# Patient Record
Sex: FEMALE | Race: WHITE | NOT HISPANIC OR LATINO | Employment: STUDENT | URBAN - METROPOLITAN AREA
[De-identification: names, ages, dates, MRNs, and addresses within clinical notes are randomized per-mention and may not be internally consistent; named-entity substitution may affect disease eponyms.]

---

## 2020-07-21 ENCOUNTER — OFFICE VISIT (OUTPATIENT)
Dept: FAMILY MEDICINE | Facility: CLINIC | Age: 20
End: 2020-07-21
Payer: COMMERCIAL

## 2020-07-21 DIAGNOSIS — U07.1 COVID-19 VIRUS DETECTED: ICD-10-CM

## 2020-07-21 DIAGNOSIS — R53.83 FATIGUE, UNSPECIFIED TYPE: Primary | ICD-10-CM

## 2020-07-21 DIAGNOSIS — F32.A DEPRESSION, UNSPECIFIED DEPRESSION TYPE: ICD-10-CM

## 2020-07-21 PROCEDURE — 99213 PR OFFICE/OUTPT VISIT, EST, LEVL III, 20-29 MIN: ICD-10-PCS | Mod: 95,ICN,, | Performed by: FAMILY MEDICINE

## 2020-07-21 PROCEDURE — 99213 OFFICE O/P EST LOW 20 MIN: CPT | Mod: 95,ICN,, | Performed by: FAMILY MEDICINE

## 2020-07-21 RX ORDER — ESCITALOPRAM OXALATE 10 MG/1
10 TABLET ORAL DAILY
Qty: 30 TABLET | Refills: 0 | Status: SHIPPED | OUTPATIENT
Start: 2020-07-21 | End: 2020-08-11 | Stop reason: SDUPTHER

## 2020-07-21 NOTE — PROGRESS NOTES
The patient location is: Home  The chief complaint leading to consultation is:Fatigue and DepressionVisit type: Virtual visit with synchronous audio and video  Total time spent with patient: 10 minutes  Each patient to whom he or she provides medical services by telemedicine is:  (1) informed of the relationship between the physician and patient and the respective role of any other health care provider with respect to management of the patient; and (2) notified that he or she may decline to receive medical services by telemedicine and may withdraw from such care at any time.    Notes:   Lissteh Rivas presents with moderate fatigue throughout the day, trouble initiating sleep w poor sleep quality and depressed feeling for several month maybe a year ago. FH Depression in Mother treated 2 times w lexapro w good results and no side effects. Patient has no precipitating event or obvious environmental contribution. Has been worse since covid lockdown.    No past medical history on file.  No past surgical history on file.  Review of patient's allergies indicates:   Allergen Reactions    Oranges [orange] Rash     No current outpatient medications on file prior to visit.     No current facility-administered medications on file prior to visit.      Social History     Socioeconomic History    Marital status: Single     Spouse name: Not on file    Number of children: Not on file    Years of education: Not on file    Highest education level: Not on file   Occupational History    Not on file   Social Needs    Financial resource strain: Not on file    Food insecurity     Worry: Not on file     Inability: Not on file    Transportation needs     Medical: Not on file     Non-medical: Not on file   Tobacco Use    Smoking status: Passive Smoke Exposure - Never Smoker   Substance and Sexual Activity    Alcohol use: Not on file    Drug use: Not on file    Sexual activity: Not on file   Lifestyle    Physical activity      Days per week: Not on file     Minutes per session: Not on file    Stress: Not on file   Relationships    Social connections     Talks on phone: Not on file     Gets together: Not on file     Attends Restoration service: Not on file     Active member of club or organization: Not on file     Attends meetings of clubs or organizations: Not on file     Relationship status: Not on file   Other Topics Concern    Not on file   Social History Narrative    Lives with mother, who smokes outside and occasionally in the house, 1 brother and 1 dog.     Family History   Problem Relation Age of Onset    Asthma Mother     Asthma Brother     Diabetes Maternal Grandmother     Diabetes Paternal Grandfather     Asthma Paternal Grandfather          ROS:  SKIN: No rashes, itching or changes in color or texture of skin.  EYES: Visual acuity fine. No photophobia, ocular pain or diplopia.EARS: Denies ear pain, discharge or vertigo.NOSE: No loss of smell, no epistaxis some postnasal drip.MOUTH & THROAT: No hoarseness or change in voice. No excessive gum bleeding.CHEST: Denies SALGADO, cyanosis, wheezing  CARDIOVASCULAR: Denies chest pain, PND, orthopnea or reduced exercise tolerance.  ABDOMEN:  No weight loss.No abdominal pain, no hematemesis or blood in stool.  URINARY: No flank pain, dysuria or hematuria.  PERIPHERAL VASCULAR: No claudication or cyanosis.  MUSCULOSKELETAL: Negative   NEUROLOGIC: No history of seizures, paralysis, alteration of gait or coordination.    PE: MSE MSE: Patient is alert and oriented x4, no plosive speech, agitation,tangential thoughts. No SI. Admits to depression and is engaged in trying to get better. Has access to counselor and will begin soon.  Impression: Fatigue  Depression   Plan: Rev med risks including incr risk suicide in adolescents, she is to report immediately any suicidal ideation or other side effects  FU PCP 2 w

## 2020-08-11 DIAGNOSIS — F32.A DEPRESSION, UNSPECIFIED DEPRESSION TYPE: Primary | ICD-10-CM

## 2020-08-11 RX ORDER — ESCITALOPRAM OXALATE 10 MG/1
10 TABLET ORAL DAILY
Qty: 30 TABLET | Refills: 0 | Status: SHIPPED | OUTPATIENT
Start: 2020-08-11 | End: 2020-09-08 | Stop reason: SDUPTHER

## 2020-08-11 NOTE — TELEPHONE ENCOUNTER
Patient called in regards to appointment scheduled for 8/25/20. Patient states that she has 8 more pills left and it may not last until her  next appointment date. Patient is requesting a Rx refill refill until she sees Dr. Apple on 8/25/20

## 2020-08-11 NOTE — TELEPHONE ENCOUNTER
----- Message from Jeana Ricky sent at 8/11/2020  9:11 AM CDT -----  Type:  RX Refill Request    Who Called: Lisseth Rivas  Refill or New Rx: Refill  RX Name and Strength: escitalopram oxalate (LEXAPRO) 10 MG tablet  How is the patient currently taking it? (ex. 1XDay): Take 1 tablet (10 mg total) by mouth once daily. - Oral  Is this a 30 day or 90 day RX: 30 days   Preferred Pharmacy with phone number:  [id number] - [facility] [address] AT [facility] [address]  [address] [facility] [address]  Phone: [phone] Fax: [phone]    Local or Mail Order:local  Ordering Provider: Zechariah  Would the patient rather a call back or a response via [facility]? Call back   Best Call Back Number:[phone] (cell)   Additional Information: Patient called in regards to appointment scheduled for 8/25/20. Patient states that she has 8 more pills left and it may not last until her  next appointment date. Patient is requesting a Rx refill refill until she sees Dr. Apple on 8/25/20

## 2020-08-25 ENCOUNTER — OFFICE VISIT (OUTPATIENT)
Dept: FAMILY MEDICINE | Facility: CLINIC | Age: 20
End: 2020-08-25
Payer: COMMERCIAL

## 2020-08-25 ENCOUNTER — LAB VISIT (OUTPATIENT)
Dept: LAB | Facility: HOSPITAL | Age: 20
End: 2020-08-25
Attending: INTERNAL MEDICINE
Payer: COMMERCIAL

## 2020-08-25 VITALS
SYSTOLIC BLOOD PRESSURE: 100 MMHG | DIASTOLIC BLOOD PRESSURE: 60 MMHG | TEMPERATURE: 99 F | BODY MASS INDEX: 21.44 KG/M2 | WEIGHT: 121 LBS | HEIGHT: 63 IN | HEART RATE: 73 BPM

## 2020-08-25 DIAGNOSIS — R53.82 CHRONIC FATIGUE: ICD-10-CM

## 2020-08-25 DIAGNOSIS — F32.4 MAJOR DEPRESSIVE DISORDER WITH SINGLE EPISODE, IN PARTIAL REMISSION: ICD-10-CM

## 2020-08-25 DIAGNOSIS — Z11.59 ENCOUNTER FOR HEPATITIS C SCREENING TEST FOR LOW RISK PATIENT: ICD-10-CM

## 2020-08-25 DIAGNOSIS — R53.82 CHRONIC FATIGUE: Primary | ICD-10-CM

## 2020-08-25 DIAGNOSIS — Z00.00 ENCOUNTER FOR ANNUAL HEALTH EXAMINATION: ICD-10-CM

## 2020-08-25 DIAGNOSIS — N93.9 ABNORMAL UTERINE BLEEDING (AUB): ICD-10-CM

## 2020-08-25 LAB
25(OH)D3+25(OH)D2 SERPL-MCNC: 24 NG/ML (ref 30–96)
ALBUMIN SERPL BCP-MCNC: 4.4 G/DL (ref 3.5–5.2)
ALP SERPL-CCNC: 68 U/L (ref 55–135)
ALT SERPL W/O P-5'-P-CCNC: 13 U/L (ref 10–44)
ANION GAP SERPL CALC-SCNC: 10 MMOL/L (ref 8–16)
AST SERPL-CCNC: 19 U/L (ref 10–40)
BILIRUB SERPL-MCNC: 0.8 MG/DL (ref 0.1–1)
BUN SERPL-MCNC: 14 MG/DL (ref 6–20)
CALCIUM SERPL-MCNC: 10.1 MG/DL (ref 8.7–10.5)
CHLORIDE SERPL-SCNC: 106 MMOL/L (ref 95–110)
CO2 SERPL-SCNC: 24 MMOL/L (ref 23–29)
CREAT SERPL-MCNC: 0.8 MG/DL (ref 0.5–1.4)
ERYTHROCYTE [DISTWIDTH] IN BLOOD BY AUTOMATED COUNT: 12.4 % (ref 11.5–14.5)
EST. GFR  (AFRICAN AMERICAN): >60 ML/MIN/1.73 M^2
EST. GFR  (NON AFRICAN AMERICAN): >60 ML/MIN/1.73 M^2
FERRITIN SERPL-MCNC: 53 NG/ML (ref 20–300)
GLUCOSE SERPL-MCNC: 87 MG/DL (ref 70–110)
HCT VFR BLD AUTO: 39.6 % (ref 37–48.5)
HGB BLD-MCNC: 12.5 G/DL (ref 12–16)
IRON SERPL-MCNC: 72 UG/DL (ref 30–160)
MCH RBC QN AUTO: 29.2 PG (ref 27–31)
MCHC RBC AUTO-ENTMCNC: 31.6 G/DL (ref 32–36)
MCV RBC AUTO: 93 FL (ref 82–98)
PLATELET # BLD AUTO: 247 K/UL (ref 150–350)
PMV BLD AUTO: 12.9 FL (ref 9.2–12.9)
POTASSIUM SERPL-SCNC: 3.4 MMOL/L (ref 3.5–5.1)
PROT SERPL-MCNC: 7.8 G/DL (ref 6–8.4)
RBC # BLD AUTO: 4.28 M/UL (ref 4–5.4)
SATURATED IRON: 19 % (ref 20–50)
SODIUM SERPL-SCNC: 140 MMOL/L (ref 136–145)
TOTAL IRON BINDING CAPACITY: 383 UG/DL (ref 250–450)
TRANSFERRIN SERPL-MCNC: 259 MG/DL (ref 200–375)
WBC # BLD AUTO: 10.61 K/UL (ref 3.9–12.7)

## 2020-08-25 PROCEDURE — 86803 HEPATITIS C AB TEST: CPT

## 2020-08-25 PROCEDURE — 99999 PR PBB SHADOW E&M-EST. PATIENT-LVL III: CPT | Mod: PBBFAC,,, | Performed by: INTERNAL MEDICINE

## 2020-08-25 PROCEDURE — 36415 COLL VENOUS BLD VENIPUNCTURE: CPT | Mod: PO

## 2020-08-25 PROCEDURE — 85027 COMPLETE CBC AUTOMATED: CPT

## 2020-08-25 PROCEDURE — 99204 PR OFFICE/OUTPT VISIT, NEW, LEVL IV, 45-59 MIN: ICD-10-PCS | Mod: S$PBB,ICN,, | Performed by: INTERNAL MEDICINE

## 2020-08-25 PROCEDURE — 82728 ASSAY OF FERRITIN: CPT

## 2020-08-25 PROCEDURE — 80053 COMPREHEN METABOLIC PANEL: CPT

## 2020-08-25 PROCEDURE — 82306 VITAMIN D 25 HYDROXY: CPT

## 2020-08-25 PROCEDURE — 99999 PR PBB SHADOW E&M-EST. PATIENT-LVL III: ICD-10-PCS | Mod: PBBFAC,,, | Performed by: INTERNAL MEDICINE

## 2020-08-25 PROCEDURE — 83540 ASSAY OF IRON: CPT

## 2020-08-25 PROCEDURE — 99204 OFFICE O/P NEW MOD 45 MIN: CPT | Mod: S$PBB,ICN,, | Performed by: INTERNAL MEDICINE

## 2020-08-25 RX ORDER — FLUTICASONE PROPIONATE 50 MCG
1 SPRAY, SUSPENSION (ML) NASAL
COMMUNITY
Start: 2019-10-28 | End: 2022-05-26

## 2020-08-25 RX ORDER — ESTRADIOL 2 MG/1
1 TABLET ORAL DAILY
COMMUNITY
Start: 2020-08-18 | End: 2022-05-26

## 2020-08-25 NOTE — PROGRESS NOTES
Assessment/Plan:    Encounter for annual health examination  Complete history and physical was completed today.  Complete and thorough medication reconciliation was performed.  Discussed risks and benefits of medications.  Advised patient on orders and health maintenance.  We discussed old records and old labs if available.  Will request any records not available through epic.  Continue current medications listed on your summary sheet.    Abnormal uterine bleeding (AUB)  -recent vaginal spotting x several weeks  -on depo injections for the past year  -started PO estradiol with improvement of symptoms  -has GYN follow up next week    Chronic fatigue  -increased fatigue for the past several years  -no improvement since starting SSRI  -check CBC, CMP, TSH, iron studies, vit d    Major depressive disorder with single episode, in partial remission  -hx of depression and anxiety  -started lexapro 10 mg last month  -improvement of symptoms since then with some residual symptoms still present  -denies adverse effects of medication  -plan to continue at current dose for now. If symptoms still present in 2-4 weeks, will increase to 20 mg QD    ______________________________________________________________________________________________________________________________    Orders this visit:    Chronic fatigue  -     Iron and TIBC; Future; Expected date: 08/25/2020  -     Ferritin; Future; Expected date: 08/25/2020  -     Vitamin D; Future; Expected date: 08/25/2020  -     CBC Without Differential; Standing    Major depressive disorder with single episode, in partial remission    Encounter for annual health examination  -     Comprehensive metabolic panel; Standing  -     CBC Without Differential; Standing    Encounter for hepatitis C screening test for low risk patient  -     Hepatitis C Antibody; Future; Expected date: 08/25/2020    Abnormal uterine bleeding (AUB)      Follow up in about 1 year (around 8/25/2021), or if  symptoms worsen or fail to improve.    Jeanette Apple MD  ______________________________________________________________________________________________________________________________    HPI:    Patient is a new patient to me establish care.  Patient is a 19-year-old  female with a past medical history of depression and anxiety.    Previous PCP:  Pediatrician's office    Patient evaluated by 1 of my partners last month for symptoms of depression.  Patient with a family history of depression and anxiety.  Started on Lexapro 10 mg daily.  Reports improvement of depression symptoms since starting medication.  Still continues to have some depressed mood, however much improved.  She also has a history of chronic fatigue, present for the past several years.  Reports that she finds herself sleeping all the time.  Although she has had an improvement in her mood since starting Lexapro, she has not had improvement in her energy level.  Denies any associated chest pains, palpitations, shortness of breath, headaches, abdominal pain, nausea/vomiting, bowel changes, hair/skin changes, cold or heat intolerance.    Of note, patient is also scheduled to follow-up with her gyn soon.  She has been experiencing vaginal spotting.  She has been receiving Depo injections for the past year.  Over the past several months, she started developing vaginal bleeding.  Denies any abdominal pains.  Denies any vaginal discharge.  She was started on estradiol started the symptoms with improvement of bleeding.  She has a follow-up with gyn next month.    Patient no other complaints today.  Routine health maintenance reviewed.  Annual labs ordered.    Past Medical History:  Past Medical History:   Diagnosis Date    Allergic rhinitis     Depression      History reviewed. No pertinent surgical history.  Review of patient's allergies indicates:   Allergen Reactions    Corticosteroids (glucocorticoids) Swelling    Cigarette smoke Hives      "Coughing     Social History     Tobacco Use    Smoking status: Never Smoker    Smokeless tobacco: Never Used   Substance Use Topics    Alcohol use: Not Currently    Drug use: Never     Family History   Problem Relation Age of Onset    Asthma Mother     Anxiety disorder Father     Asthma Brother     Diabetes Maternal Grandmother     Diabetes Paternal Grandfather     Asthma Paternal Grandfather      Current Outpatient Medications on File Prior to Visit   Medication Sig Dispense Refill    escitalopram oxalate (LEXAPRO) 10 MG tablet Take 1 tablet (10 mg total) by mouth once daily. 30 tablet 0    estradioL (ESTRACE) 2 MG tablet Take 1 tablet by mouth once daily.      fluticasone propionate (FLONASE) 50 mcg/actuation nasal spray 1 spray by Nasal route as needed.      medroxyprogesterone acetate (DEPO-PROVERA IM) Inject into the muscle.       No current facility-administered medications on file prior to visit.        Review of Systems   Constitutional: Positive for fatigue. Negative for chills, diaphoresis, fever and unexpected weight change.   HENT: Negative for congestion, ear pain, postnasal drip, sinus pain and sore throat.    Eyes: Negative for pain and redness.   Respiratory: Negative for cough, chest tightness and shortness of breath.    Cardiovascular: Negative for chest pain and leg swelling.   Gastrointestinal: Negative for abdominal pain, constipation, diarrhea, nausea and vomiting.   Genitourinary: Negative for dysuria and hematuria.   Musculoskeletal: Negative for arthralgias and joint swelling.   Skin: Negative for rash.   Neurological: Negative for dizziness, syncope and headaches.       Vitals:    08/25/20 1407   BP: 100/60   Pulse: 73   Temp: 99.1 °F (37.3 °C)   Weight: 54.9 kg (121 lb)   Height: 5' 3" (1.6 m)       Wt Readings from Last 3 Encounters:   08/25/20 54.9 kg (121 lb) (36 %, Z= -0.36)*   05/07/12 37.4 kg (82 lb 8 oz) (38 %, Z= -0.30)*   05/08/12 40.1 kg (88 lb 6.1 oz) (52 %, Z= " 0.04)*     * Growth percentiles are based on CDC (Girls, 2-20 Years) data.       Physical Exam  Constitutional:       General: She is not in acute distress.     Appearance: Normal appearance. She is well-developed.   HENT:      Head: Normocephalic and atraumatic.   Eyes:      Conjunctiva/sclera: Conjunctivae normal.   Neck:      Musculoskeletal: Normal range of motion and neck supple.   Cardiovascular:      Rate and Rhythm: Normal rate and regular rhythm.      Pulses: Normal pulses.      Heart sounds: Normal heart sounds. No murmur.   Pulmonary:      Effort: Pulmonary effort is normal. No respiratory distress.      Breath sounds: Normal breath sounds.   Abdominal:      General: Bowel sounds are normal. There is no distension.      Palpations: Abdomen is soft.      Tenderness: There is no abdominal tenderness.   Musculoskeletal: Normal range of motion.   Skin:     General: Skin is warm and dry.      Findings: No rash.   Neurological:      General: No focal deficit present.      Mental Status: She is alert and oriented to person, place, and time.

## 2020-08-25 NOTE — ASSESSMENT & PLAN NOTE
-increased fatigue for the past several years  -no improvement since starting SSRI  -check CBC, CMP, TSH, iron studies, vit d

## 2020-08-25 NOTE — ASSESSMENT & PLAN NOTE
-recent vaginal spotting x several weeks  -on depo injections for the past year  -started PO estradiol with improvement of symptoms  -has GYN follow up next week

## 2020-08-26 LAB — HCV AB SERPL QL IA: NEGATIVE

## 2020-08-31 DIAGNOSIS — R53.82 CHRONIC FATIGUE: Primary | ICD-10-CM

## 2020-09-09 ENCOUNTER — PATIENT MESSAGE (OUTPATIENT)
Dept: FAMILY MEDICINE | Facility: CLINIC | Age: 20
End: 2020-09-09

## 2020-11-05 ENCOUNTER — TELEPHONE (OUTPATIENT)
Dept: FAMILY MEDICINE | Facility: CLINIC | Age: 20
End: 2020-11-05

## 2020-11-05 NOTE — TELEPHONE ENCOUNTER
MD ABIGAIL Franco Staff             Please make sure patient is aware that she should follow up with me, especially if she is still having the symptoms that caused her to go to the ER.

## 2020-11-30 PROBLEM — Z00.00 ENCOUNTER FOR ANNUAL HEALTH EXAMINATION: Status: RESOLVED | Noted: 2020-08-25 | Resolved: 2020-11-30

## 2021-05-06 ENCOUNTER — PATIENT MESSAGE (OUTPATIENT)
Dept: RESEARCH | Facility: HOSPITAL | Age: 21
End: 2021-05-06

## 2022-01-06 ENCOUNTER — TELEPHONE (OUTPATIENT)
Dept: FAMILY MEDICINE | Facility: CLINIC | Age: 22
End: 2022-01-06

## 2022-01-06 ENCOUNTER — CLINICAL SUPPORT (OUTPATIENT)
Dept: FAMILY MEDICINE | Facility: CLINIC | Age: 22
End: 2022-01-06
Payer: COMMERCIAL

## 2022-01-06 ENCOUNTER — OFFICE VISIT (OUTPATIENT)
Dept: FAMILY MEDICINE | Facility: CLINIC | Age: 22
End: 2022-01-06
Payer: COMMERCIAL

## 2022-01-06 DIAGNOSIS — J02.9 PHARYNGITIS, UNSPECIFIED ETIOLOGY: ICD-10-CM

## 2022-01-06 DIAGNOSIS — R05.9 COUGH: ICD-10-CM

## 2022-01-06 DIAGNOSIS — J06.9 UPPER RESPIRATORY TRACT INFECTION, UNSPECIFIED TYPE: Primary | ICD-10-CM

## 2022-01-06 LAB
CTP QC/QA: YES
CTP QC/QA: YES
FLUAV AG NPH QL: NEGATIVE
FLUBV AG NPH QL: NEGATIVE
SARS-COV-2 RDRP RESP QL NAA+PROBE: POSITIVE

## 2022-01-06 PROCEDURE — 1160F RVW MEDS BY RX/DR IN RCRD: CPT | Mod: CPTII,95,, | Performed by: NURSE PRACTITIONER

## 2022-01-06 PROCEDURE — 1159F MED LIST DOCD IN RCRD: CPT | Mod: CPTII,95,, | Performed by: NURSE PRACTITIONER

## 2022-01-06 PROCEDURE — 1159F PR MEDICATION LIST DOCUMENTED IN MEDICAL RECORD: ICD-10-PCS | Mod: CPTII,95,, | Performed by: NURSE PRACTITIONER

## 2022-01-06 PROCEDURE — 1160F PR REVIEW ALL MEDS BY PRESCRIBER/CLIN PHARMACIST DOCUMENTED: ICD-10-PCS | Mod: CPTII,95,, | Performed by: NURSE PRACTITIONER

## 2022-01-06 PROCEDURE — 99213 OFFICE O/P EST LOW 20 MIN: CPT | Mod: 95,,, | Performed by: NURSE PRACTITIONER

## 2022-01-06 PROCEDURE — 99213 PR OFFICE/OUTPT VISIT, EST, LEVL III, 20-29 MIN: ICD-10-PCS | Mod: 95,,, | Performed by: NURSE PRACTITIONER

## 2022-01-06 RX ORDER — PROMETHAZINE HYDROCHLORIDE AND DEXTROMETHORPHAN HYDROBROMIDE 6.25; 15 MG/5ML; MG/5ML
5 SYRUP ORAL 2 TIMES DAILY PRN
Qty: 118 ML | Refills: 0 | Status: SHIPPED | OUTPATIENT
Start: 2022-01-06 | End: 2022-01-16

## 2022-01-06 RX ORDER — AZITHROMYCIN 250 MG/1
TABLET, FILM COATED ORAL
Qty: 6 TABLET | Refills: 0 | Status: SHIPPED | OUTPATIENT
Start: 2022-01-06 | End: 2022-01-11

## 2022-01-06 NOTE — PROGRESS NOTES
Subjective:       Patient ID: Lisseth Rivas is a 21 y.o. female.    Chief Complaint: No chief complaint on file.  The patient location is: [place]  The chief complaint leading to consultation is: cough, sore throat    Visit type: audiovisual    Face to Face time with patient: 15 min  minutes of total time spent on the encounter, which includes face to face time and non-face to face time preparing to see the patient (eg, review of tests), Obtaining and/or reviewing separately obtained history, Documenting clinical information in the electronic or other health record, Independently interpreting results (not separately reported) and communicating results to the patient/family/caregiver, or Care coordination (not separately reported).         Each patient to whom he or she provides medical services by telemedicine is:  (1) informed of the relationship between the physician and patient and the respective role of any other health care provider with respect to management of the patient; and (2) notified that he or she may decline to receive medical services by telemedicine and may withdraw from such care at any time.    Notes:     Sore Throat   This is a new problem. The current episode started yesterday. The problem has been gradually worsening. Neither side of throat is experiencing more pain than the other. There has been no fever. The fever has been present for less than 1 day. The pain is at a severity of 6/10. The pain is mild. Associated symptoms include congestion, coughing, ear pain, headaches, a hoarse voice and trouble swallowing. Pertinent negatives include no abdominal pain, diarrhea, drooling, ear discharge, plugged ear sensation, neck pain, shortness of breath, stridor, swollen glands or vomiting. She has had no exposure to strep or mono. She has tried nothing for the symptoms. The treatment provided no relief.     Past Medical History:   Diagnosis Date    Allergic rhinitis     Depression      Social  History     Socioeconomic History    Marital status: Single   Tobacco Use    Smoking status: Never Smoker    Smokeless tobacco: Never Used   Substance and Sexual Activity    Alcohol use: Not Currently    Drug use: Never   Social History Narrative    Lives with mother, who smokes outside and occasionally in the house, 1 brother and 1 dog.     History reviewed. No pertinent surgical history.    Review of Systems   Constitutional: Negative.    HENT: Positive for nasal congestion, ear pain, hoarse voice, sore throat and trouble swallowing. Negative for drooling and ear discharge.    Eyes: Negative.    Respiratory: Positive for cough. Negative for shortness of breath and stridor.    Cardiovascular: Negative.    Gastrointestinal: Negative.  Negative for abdominal pain, diarrhea and vomiting.   Endocrine: Negative.    Genitourinary: Negative.    Musculoskeletal: Negative.  Negative for neck pain.   Integumentary:  Negative.   Allergic/Immunologic: Negative.    Neurological: Positive for headaches.   Psychiatric/Behavioral: Negative.          Objective:      Physical Exam  Constitutional:       Appearance: She is ill-appearing.   Neurological:      Mental Status: She is alert.         Assessment:       Problem List Items Addressed This Visit    None     Visit Diagnoses     Upper respiratory tract infection, unspecified type    -  Primary    Pharyngitis, unspecified etiology        Cough        Relevant Orders    POCT COVID-19 Rapid Screening (Completed)    POCT Influenza A/B (Completed)          Plan:           Diagnoses and all orders for this visit:    Upper respiratory tract infection, unspecified type  Pharyngitis, unspecified etiology  Cough  -     POCT COVID-19 Rapid Screening  -     POCT Influenza A/B  -     azithromycin (Z-JOSH) 250 MG tablet; Take 2 tablets by mouth on day 1; Take 1 tablet by mouth on days 2-5  -     (Magic mouthwash) 1:1:1 Benadryl 12.5mg/5ml liq, aluminum & magnesium hydroxide-simehticone  (Maalox), LIDOcaine viscous 2%; Swish and spit 5 mLs every 8 (eight) hours as needed. Gargle and spit. DO NOT SWALLOW  -     promethazine-dextromethorphan (PROMETHAZINE-DM) 6.25-15 mg/5 mL Syrp; Take 5 mLs by mouth 2 (two) times daily as needed.  COVID-19 home instructions given  Hydrate well  Rest  Tylenol OTC as directed  Report to ER immediately if symptoms worsen

## 2022-01-06 NOTE — PATIENT INSTRUCTIONS
Hydrate well  Rest  Tylenol OTC as directed  Report to ER immediately if symptoms worsen    Instructions for Patients with Confirmed or Suspected COVID-19    If you are awaiting your test result, you will either be called or it will be released to the patient portal.  If you have any questions about your test, please visit [email or web address] or call our COVID-19 information line at [phone].      Please isolate yourself at home.  You may leave home and/or return to work once the following conditions are met:    If you have symptoms and tested positive:   More than 5 days since symptoms first appeared AND   More than 24 hours fever free without medications AND       symptoms have improved   · For five days after ending isolation, masks are required.    If you had no symptoms but tested positive:   More than 5 days since the date of the first positive test. If you develop symptoms, then use the guidelines above  · For five days after ending isolation, masks are required.      Testing is not recommended if you are symptom free after completing isolation.

## 2022-05-02 ENCOUNTER — PATIENT MESSAGE (OUTPATIENT)
Dept: ADMINISTRATIVE | Facility: HOSPITAL | Age: 22
End: 2022-05-02
Payer: COMMERCIAL

## 2022-05-10 LAB — PAP RECOMMENDATION EXT: NORMAL

## 2022-05-25 ENCOUNTER — TELEPHONE (OUTPATIENT)
Dept: FAMILY MEDICINE | Facility: CLINIC | Age: 22
End: 2022-05-25
Payer: COMMERCIAL

## 2022-05-25 NOTE — TELEPHONE ENCOUNTER
Patient was recently diagnosed with bipolar disorder at [facility]. Her mother will be sending a more detailed [employer] message for provider to review. They advised to reach out to primary care to discuss restarting medication for anxiety and depression until she can get in to see Psychiatry. She was previously on lexapro.     Virtual visit scheduled with PA for tomorrow at 4:20 pm.

## 2022-05-25 NOTE — TELEPHONE ENCOUNTER
----- Message from Aniya Donald sent at 5/25/2022  4:32 PM CDT -----  Contact: Tigist(mother)[phone]  Pt mother states the pt was seen at [facility] today and diagnosed with bipolar disorder. Pt mother states the pt does not have an appt with the NP til August and was advised to reach out to her PCP about meds for the pt. Please call and advise.

## 2022-05-26 ENCOUNTER — OFFICE VISIT (OUTPATIENT)
Dept: FAMILY MEDICINE | Facility: CLINIC | Age: 22
End: 2022-05-26
Payer: COMMERCIAL

## 2022-05-26 DIAGNOSIS — F33.1 MODERATE EPISODE OF RECURRENT MAJOR DEPRESSIVE DISORDER: Primary | ICD-10-CM

## 2022-05-26 PROBLEM — F33.0 MILD EPISODE OF RECURRENT MAJOR DEPRESSIVE DISORDER: Status: ACTIVE | Noted: 2022-05-26

## 2022-05-26 PROCEDURE — 1159F PR MEDICATION LIST DOCUMENTED IN MEDICAL RECORD: ICD-10-PCS | Mod: CPTII,95,, | Performed by: PHYSICIAN ASSISTANT

## 2022-05-26 PROCEDURE — 99214 OFFICE O/P EST MOD 30 MIN: CPT | Mod: 95,,, | Performed by: PHYSICIAN ASSISTANT

## 2022-05-26 PROCEDURE — 1160F PR REVIEW ALL MEDS BY PRESCRIBER/CLIN PHARMACIST DOCUMENTED: ICD-10-PCS | Mod: CPTII,95,, | Performed by: PHYSICIAN ASSISTANT

## 2022-05-26 PROCEDURE — 1159F MED LIST DOCD IN RCRD: CPT | Mod: CPTII,95,, | Performed by: PHYSICIAN ASSISTANT

## 2022-05-26 PROCEDURE — 1160F RVW MEDS BY RX/DR IN RCRD: CPT | Mod: CPTII,95,, | Performed by: PHYSICIAN ASSISTANT

## 2022-05-26 PROCEDURE — 99214 PR OFFICE/OUTPT VISIT, EST, LEVL IV, 30-39 MIN: ICD-10-PCS | Mod: 95,,, | Performed by: PHYSICIAN ASSISTANT

## 2022-05-26 RX ORDER — HYDROXYZINE PAMOATE 25 MG/1
25 CAPSULE ORAL EVERY 8 HOURS PRN
Qty: 60 CAPSULE | Refills: 2 | Status: SHIPPED | OUTPATIENT
Start: 2022-05-26

## 2022-05-26 RX ORDER — ESCITALOPRAM OXALATE 10 MG/1
10 TABLET ORAL DAILY
Qty: 30 TABLET | Refills: 2 | Status: SHIPPED | OUTPATIENT
Start: 2022-05-26

## 2022-05-26 NOTE — PROGRESS NOTES
Primary Care Telemedicine Note  The patient location is: Patient Home   The chief complaint leading to consultation is: Depression  Total time spent with patient: 20 minutes    Visit type: Virtual visit with synchronous audio only and video  Each patient to whom he or she provides medical services by telemedicine is: (1) informed of the relationship between the physician and patient and the respective role of any other health care provider with respect to management of the patient; and (2) notified that he or she may decline to receive medical services by telemedicine and may withdraw from such care at any time.      Assessment/Plan:    Problem List Items Addressed This Visit        Psychiatric    Moderate episode of recurrent major depressive disorder - Primary    Overview     -hx of depression and anxiety, worsening of symptoms over the past 4 months  -previously on Lexapro, unsure if it helped  -plan to restart Lexapro 10 mg daily and Vistaril prn  -discussed SSRI/SNRI as first-line treatment for this condition  -discussed risk of discontinuing this medication without tapering  -patient was educated, advised of side effects, and all questions were answered. Patient voiced understanding  -patient will follow up routinely and notify us if having any side effects or worsening or persistent symptoms. ER precautions were given.           Relevant Medications    hydrOXYzine pamoate (VISTARIL) 25 MG Cap    EScitalopram oxalate (LEXAPRO) 10 MG tablet          Follow up in about 1 month (around 6/26/2022).    Janet Carreon PA-C  _____________________________________________________________________________________________________________________________________________________    CC: anxiety and depression    HPI: Patient is an established patient who presents today via virtual visit for anxiety and depression. She complains of anhedonia, depressed mood, irritability, fatigue, feelings of worthlessness/guilt and  insomnia. Onset was about 3 years ago with worsening of symptoms over the past 4 months. She denies current suicidal and homicidal plan or intent. She stated that her symptoms are starting to affect her personal relationships. She was previously on Lexapro, but is not currently taking anything for symptoms. She is unsure if the medication helped or not. No other complaints today.     Past Medical History:   Diagnosis Date    Allergic rhinitis     Depression      History reviewed. No pertinent surgical history.  Review of patient's allergies indicates:   Allergen Reactions    Corticosteroids (glucocorticoids) Swelling    Cigarette smoke Hives     Coughing     Social History     Tobacco Use    Smoking status: Never Smoker    Smokeless tobacco: Never Used   Substance Use Topics    Alcohol use: Not Currently    Drug use: Never     Family History   Problem Relation Age of Onset    Asthma Mother     Anxiety disorder Father     Asthma Brother     Diabetes Maternal Grandmother     Diabetes Paternal Grandfather     Asthma Paternal Grandfather      Current Outpatient Medications on File Prior to Visit   Medication Sig Dispense Refill    medroxyprogesterone acetate (DEPO-PROVERA IM) Inject into the muscle.      [DISCONTINUED] escitalopram oxalate (LEXAPRO) 20 MG tablet Take 1 tablet (20 mg total) by mouth once daily. 30 tablet 0    [DISCONTINUED] estradioL (ESTRACE) 2 MG tablet Take 1 tablet by mouth once daily.      [DISCONTINUED] fluticasone propionate (FLONASE) 50 mcg/actuation nasal spray 1 spray by Nasal route as needed.       No current facility-administered medications on file prior to visit.     Review of Systems   HENT: Negative for hearing loss.    Eyes: Negative for discharge.   Respiratory: Negative for wheezing.    Cardiovascular: Negative for chest pain and palpitations.   Gastrointestinal: Negative for blood in stool, constipation, diarrhea and vomiting.   Genitourinary: Negative for dysuria  and hematuria.   Musculoskeletal: Negative for neck pain.   Neurological: Positive for weakness and headaches.   Endo/Heme/Allergies: Negative for polydipsia.   Psychiatric/Behavioral: Positive for depression.     Physical Exam  Constitutional:       General: She is not in acute distress.     Appearance: She is well-developed.   HENT:      Head: Normocephalic and atraumatic.   Pulmonary:      Effort: Pulmonary effort is normal. No respiratory distress.   Musculoskeletal:      Cervical back: Normal range of motion.   Neurological:      Mental Status: She is alert and oriented to person, place, and time.   Psychiatric:         Behavior: Behavior normal.       The patient's Health Maintenance was reviewed and the following appears to be due at this time:  Health Maintenance Due   Topic Date Due    Lipid Panel  Never done    HIV Screening  Never done    TETANUS VACCINE  Never done    Pap Smear  Never done       Answers for HPI/ROS submitted by the patient on 5/25/2022  activity change: Yes  unexpected weight change: Yes  rhinorrhea: No  trouble swallowing: No  visual disturbance: No  chest tightness: No  polyuria: No  difficulty urinating: No  menstrual problem: No  joint swelling: No  arthralgias: No  confusion: No  dysphoric mood: Yes

## 2022-05-27 ENCOUNTER — PATIENT MESSAGE (OUTPATIENT)
Dept: FAMILY MEDICINE | Facility: CLINIC | Age: 22
End: 2022-05-27
Payer: COMMERCIAL

## 2022-05-27 ENCOUNTER — TELEPHONE (OUTPATIENT)
Dept: ADMINISTRATIVE | Facility: HOSPITAL | Age: 22
End: 2022-05-27
Payer: COMMERCIAL

## 2022-06-13 ENCOUNTER — PATIENT OUTREACH (OUTPATIENT)
Dept: ADMINISTRATIVE | Facility: HOSPITAL | Age: 22
End: 2022-06-13
Payer: COMMERCIAL

## 2022-06-13 NOTE — PROGRESS NOTES
Cervical Gap BR REPORT: Called and spoke with pt in regards to overdue pap smear. Pt says pap was done last week with Shola Louise MD. Request faxed. Care team updated. Remind me set.    No results found in [facility], [facility] or Care Everywhere

## 2022-06-13 NOTE — LETTER
AUTHORIZATION FOR RELEASE OF   CONFIDENTIAL INFORMATION      We are seeing Lisseth Rivas, date of birth 2000, in the clinic at [facility]. Jeanette Apple MD is the patient's PCP. Lisseth Rivas has an outstanding lab/procedure at the time we reviewed her chart. In order to help keep her health information updated, she has authorized us to request the following medical record(s):        (  )  MAMMOGRAM                                      (  )  COLONOSCOPY      ( X )  PAP SMEAR                                          (  )  OUTSIDE LAB RESULTS     (  )  DEXA SCAN                                          (  )  EYE EXAM            (  )  FOOT EXAM                                          (  )  ENTIRE RECORD     (  )  OUTSIDE IMMUNIZATIONS                 (  )  _______________         [facility] [phone]     If you have any questions, please contact Benji Don           Patient Name: Lisseth Rivas  : 2000  Patient Phone #: [phone]

## 2022-06-28 NOTE — PROGRESS NOTES
3rd attempt. Spoke with Vicki in medial records. Results will be faxed over today. I will follow up in a week.

## 2023-01-12 ENCOUNTER — TELEPHONE (OUTPATIENT)
Dept: FAMILY MEDICINE | Facility: CLINIC | Age: 23
End: 2023-01-12

## 2023-01-12 ENCOUNTER — CLINICAL SUPPORT (OUTPATIENT)
Dept: INTERNAL MEDICINE | Facility: CLINIC | Age: 23
End: 2023-01-12
Payer: COMMERCIAL

## 2023-01-12 ENCOUNTER — OFFICE VISIT (OUTPATIENT)
Dept: FAMILY MEDICINE | Facility: CLINIC | Age: 23
End: 2023-01-12
Payer: COMMERCIAL

## 2023-01-12 DIAGNOSIS — H01.004 BLEPHARITIS OF UPPER EYELIDS OF BOTH EYES, UNSPECIFIED TYPE: Primary | ICD-10-CM

## 2023-01-12 DIAGNOSIS — H01.001 BLEPHARITIS OF UPPER EYELIDS OF BOTH EYES, UNSPECIFIED TYPE: Primary | ICD-10-CM

## 2023-01-12 PROCEDURE — 1159F MED LIST DOCD IN RCRD: CPT | Mod: CPTII,95,, | Performed by: PHYSICIAN ASSISTANT

## 2023-01-12 PROCEDURE — 99999 PR PBB SHADOW E&M-EST. PATIENT-LVL II: CPT | Mod: PBBFAC,,,

## 2023-01-12 PROCEDURE — 96372 THER/PROPH/DIAG INJ SC/IM: CPT | Mod: S$GLB,,, | Performed by: PHYSICIAN ASSISTANT

## 2023-01-12 PROCEDURE — 1160F PR REVIEW ALL MEDS BY PRESCRIBER/CLIN PHARMACIST DOCUMENTED: ICD-10-PCS | Mod: CPTII,95,, | Performed by: PHYSICIAN ASSISTANT

## 2023-01-12 PROCEDURE — 99213 PR OFFICE/OUTPT VISIT, EST, LEVL III, 20-29 MIN: ICD-10-PCS | Mod: 95,25,, | Performed by: PHYSICIAN ASSISTANT

## 2023-01-12 PROCEDURE — 99999 PR PBB SHADOW E&M-EST. PATIENT-LVL II: ICD-10-PCS | Mod: PBBFAC,,,

## 2023-01-12 PROCEDURE — 1160F RVW MEDS BY RX/DR IN RCRD: CPT | Mod: CPTII,95,, | Performed by: PHYSICIAN ASSISTANT

## 2023-01-12 PROCEDURE — 99213 OFFICE O/P EST LOW 20 MIN: CPT | Mod: 95,25,, | Performed by: PHYSICIAN ASSISTANT

## 2023-01-12 PROCEDURE — 96372 PR INJECTION,THERAP/PROPH/DIAG2ST, IM OR SUBCUT: ICD-10-PCS | Mod: S$GLB,,, | Performed by: PHYSICIAN ASSISTANT

## 2023-01-12 PROCEDURE — 1159F PR MEDICATION LIST DOCUMENTED IN MEDICAL RECORD: ICD-10-PCS | Mod: CPTII,95,, | Performed by: PHYSICIAN ASSISTANT

## 2023-01-12 RX ORDER — METHYLPREDNISOLONE 4 MG/1
TABLET ORAL
Qty: 21 TABLET | Refills: 0 | Status: SHIPPED | OUTPATIENT
Start: 2023-01-12

## 2023-01-12 RX ORDER — ERYTHROMYCIN 5 MG/G
OINTMENT OPHTHALMIC EVERY 6 HOURS
Qty: 3.5 G | Refills: 0 | Status: SHIPPED | OUTPATIENT
Start: 2023-01-12 | End: 2023-01-22

## 2023-01-12 RX ORDER — DEXAMETHASONE SODIUM PHOSPHATE 4 MG/ML
4 INJECTION, SOLUTION INTRA-ARTICULAR; INTRALESIONAL; INTRAMUSCULAR; INTRAVENOUS; SOFT TISSUE
Status: DISCONTINUED | OUTPATIENT
Start: 2023-01-12 | End: 2023-01-12

## 2023-01-12 RX ORDER — DEXAMETHASONE SODIUM PHOSPHATE 4 MG/ML
8 INJECTION, SOLUTION INTRA-ARTICULAR; INTRALESIONAL; INTRAMUSCULAR; INTRAVENOUS; SOFT TISSUE
Status: COMPLETED | OUTPATIENT
Start: 2023-01-12 | End: 2023-01-12

## 2023-01-12 RX ORDER — DEXAMETHASONE SODIUM PHOSPHATE 4 MG/ML
8 INJECTION, SOLUTION INTRA-ARTICULAR; INTRALESIONAL; INTRAMUSCULAR; INTRAVENOUS; SOFT TISSUE ONCE
Status: DISCONTINUED | OUTPATIENT
Start: 2023-01-12 | End: 2023-01-12

## 2023-01-12 RX ORDER — AZELASTINE HYDROCHLORIDE 0.5 MG/ML
1 SOLUTION/ DROPS OPHTHALMIC 2 TIMES DAILY
Qty: 6 ML | Refills: 0 | Status: SHIPPED | OUTPATIENT
Start: 2023-01-12 | End: 2023-01-12 | Stop reason: SDUPTHER

## 2023-01-12 RX ADMIN — DEXAMETHASONE SODIUM PHOSPHATE 8 MG: 4 INJECTION, SOLUTION INTRA-ARTICULAR; INTRALESIONAL; INTRAMUSCULAR; INTRAVENOUS; SOFT TISSUE at 03:01

## 2023-01-12 NOTE — PROGRESS NOTES
Pt here for decadron shot. Tolerated well. Advised to wait in lobby for fifteen minutes following administration to monitor for s/s of allergic reaction. Pt v/u

## 2023-01-12 NOTE — PROGRESS NOTES
Primary Care Telemedicine Note  The patient location is: Patient Home   The chief complaint leading to consultation is: bilateral eye pain and swelling  Total time spent with patient: 10 minutes    Visit type: Virtual visit with synchronous audio only and video  Each patient to whom he or she provides medical services by telemedicine is: (1) informed of the relationship between the physician and patient and the respective role of any other health care provider with respect to management of the patient; and (2) notified that he or she may decline to receive medical services by telemedicine and may withdraw from such care at any time.      Assessment/Plan:    Problem List Items Addressed This Visit    None  Visit Diagnoses       Blepharitis of upper eyelids of both eyes, unspecified type    -  Primary    Relevant Medications    methylPREDNISolone (MEDROL DOSEPACK) 4 mg tablet    erythromycin (ROMYCIN) ophthalmic ointment    dexAMETHasone injection 8 mg        -decadron IM today  -start oral steroids and antibiotic ophthalmic ointment  -recommend OTC benadryl for itching  -avoid touching/scratching  -follow up if no improvement in symptoms  -ER precautions for severe or worsening of symptoms     Follow up if symptoms worsen or fail to improve.    Janet Carreon PA-C  _____________________________________________________________________________________________________________________________________________________    CC: bilateral eye pain and swelling    HPI: Patient is an established patient who presents today via virtual visit for bilateral eye pain and swelling. Symptom onset was about 2 days ago and has worsened since that time. Patient reports getting eyelash extensions put on about 2 days ago and noticed symptoms shortly after that time. She reports bilateral eye pain, swelling, erythema, and pruritis. Denies vision changes. She has been taking benadryl with minimal. She reports previously getting eyelash  extensions with no reaction, however, she stated that a different type of glue was used this time. No other complaints today.    Past Medical History:   Diagnosis Date    Allergic rhinitis     Depression      History reviewed. No pertinent surgical history.  Review of patient's allergies indicates:   Allergen Reactions    Corticosteroids (glucocorticoids) Swelling    Cigarette smoke Hives     Coughing     Social History     Tobacco Use    Smoking status: Never    Smokeless tobacco: Never   Substance Use Topics    Alcohol use: Not Currently    Drug use: Never     Family History   Problem Relation Age of Onset    Asthma Mother     Anxiety disorder Father     Asthma Brother     Diabetes Maternal Grandmother     Diabetes Paternal Grandfather     Asthma Paternal Grandfather      Current Outpatient Medications on File Prior to Visit   Medication Sig Dispense Refill    EScitalopram oxalate (LEXAPRO) 10 MG tablet Take 1 tablet (10 mg total) by mouth once daily. 30 tablet 2    hydrOXYzine pamoate (VISTARIL) 25 MG Cap Take 1 capsule (25 mg total) by mouth every 8 (eight) hours as needed (anxiety). 60 capsule 2    medroxyprogesterone acetate (DEPO-PROVERA IM) Inject into the muscle.       No current facility-administered medications on file prior to visit.       Review of Systems   Constitutional:  Negative for chills, fever and malaise/fatigue.   Eyes:  Positive for pain and redness.   Respiratory:  Negative for cough and shortness of breath.    Cardiovascular:  Negative for chest pain, palpitations and leg swelling.   Gastrointestinal:  Negative for abdominal pain, constipation and diarrhea.   Genitourinary:  Negative for dysuria and frequency.   Musculoskeletal:  Negative for back pain and joint pain.   Neurological:  Negative for headaches.   Psychiatric/Behavioral:  Negative for depression. The patient is not nervous/anxious.      Physical Exam  Constitutional:       General: She is not in acute distress.     Appearance:  She is well-developed.   HENT:      Head: Normocephalic and atraumatic.   Eyes:      Comments: +bilateral upper eyelid swelling and erythema present on exam   Pulmonary:      Effort: Pulmonary effort is normal. No respiratory distress.   Abdominal:      General: There is no distension.   Musculoskeletal:         General: Normal range of motion.      Cervical back: Normal range of motion.   Neurological:      Mental Status: She is alert and oriented to person, place, and time.   Psychiatric:         Mood and Affect: Mood normal.         Behavior: Behavior normal.       The patient's Health Maintenance was reviewed and the following appears to be due at this time:  Health Maintenance Due   Topic Date Due    Lipid Panel  Never done    HIV Screening  Never done    TETANUS VACCINE  Never done    COVID-19 Vaccine (4 - Booster for Pfizer series) 03/15/2022    Influenza Vaccine (1) 09/01/2022

## 2023-01-12 NOTE — LETTER
January 12, 2023      [facility] [address]  [address] [facility]  [place] [address]  Phone: [phone]  Fax: [phone]       Patient: Lisseth Rivas   YOB: 2000  Date of Visit: 01/12/2023    To Whom It May Concern:    Elvis Rivas  was at [facility] on 01/12/2023.If you have any questions or concerns, or if I can be of further assistance, please do not hesitate to contact me.    Sincerely,    Jackie Michele LPN

## 2023-01-12 NOTE — TELEPHONE ENCOUNTER
----- Message from Ghassan Leiva sent at 1/12/2023  1:32 PM CST -----  Contact: [phone]  Pt would like to consult with a nurse in regards to her nurse visit appt for the Decadron shot. She stated that the [facility] location does not have the shot and was told to schedule at the [place] location. Please call her back at [phone]. Thanks KB

## 2023-02-21 ENCOUNTER — PATIENT MESSAGE (OUTPATIENT)
Dept: PSYCHIATRY | Facility: CLINIC | Age: 23
End: 2023-02-21
Payer: COMMERCIAL

## 2023-02-21 ENCOUNTER — TELEPHONE (OUTPATIENT)
Dept: PSYCHIATRY | Facility: CLINIC | Age: 23
End: 2023-02-21
Payer: COMMERCIAL

## 2023-02-21 ENCOUNTER — TELEPHONE (OUTPATIENT)
Dept: FAMILY MEDICINE | Facility: CLINIC | Age: 23
End: 2023-02-21
Payer: COMMERCIAL

## 2023-02-21 DIAGNOSIS — F32.4 MAJOR DEPRESSIVE DISORDER WITH SINGLE EPISODE, IN PARTIAL REMISSION: Primary | ICD-10-CM

## 2023-02-21 NOTE — TELEPHONE ENCOUNTER
----- Message from Marcos Rivera sent at 2/21/2023  8:31 AM CST -----  Contact: Lisseth  Patient mother is calling to speak with the nurse to discuss getting patient seen, reports being diagnosed with bipolar disorder and wants to discuss her seeing a therapist. Please call [phone] or [phone]       Thanks

## 2023-02-21 NOTE — TELEPHONE ENCOUNTER
Spoke with patient mother and told her she will need to have documents on file saying her daughter gives permission to speak on her behalf. Verbalized understanding.    Mother was told that the patient would need to have a referral put in from her pcp. Verbalized understanding.

## 2023-02-21 NOTE — TELEPHONE ENCOUNTER
----- Message from Kyara Logan sent at 2/21/2023  8:43 AM CST -----  Contact: [phone]  Calling requesting psych referral to be seen within [facility] . Please call back at [phone] . Thanksdj

## 2023-02-21 NOTE — TELEPHONE ENCOUNTER
I have signed for the following orders AND/OR meds.  Please call the patient and ask the patient to schedule the testing AND/OR inform about any medications that were sent. Medications have been sent to pharmacy listed below      Orders Placed This Encounter   Procedures    Ambulatory referral/consult to Psychiatry     Standing Status:   Future     Standing Expiration Date:   3/21/2024     Referral Priority:   Routine     Referral Type:   Psychiatric     Referral Reason:   Specialty Services Required     Requested Specialty:   Psychiatry     Number of Visits Requested:   1              [id number] - [place] [address] AT [facility] OF [facility] [address]  [address] [facility] [address]  Phone: [phone] Fax: [phone]    [id number] [place] - [address] [facility]  [address] [facility]  [place] [address]  Phone: [phone] Fax: [phone]

## 2024-08-06 ENCOUNTER — OFFICE VISIT (OUTPATIENT)
Dept: PRIMARY CARE CLINIC | Facility: CLINIC | Age: 24
End: 2024-08-06
Payer: COMMERCIAL

## 2024-08-06 VITALS — WEIGHT: 160 LBS | HEIGHT: 64 IN | BODY MASS INDEX: 27.31 KG/M2

## 2024-08-06 DIAGNOSIS — F41.1 GAD (GENERALIZED ANXIETY DISORDER): Primary | ICD-10-CM

## 2024-08-06 PROCEDURE — 1160F RVW MEDS BY RX/DR IN RCRD: CPT | Mod: CPTII,95,, | Performed by: INTERNAL MEDICINE

## 2024-08-06 PROCEDURE — 1159F MED LIST DOCD IN RCRD: CPT | Mod: CPTII,95,, | Performed by: INTERNAL MEDICINE

## 2024-08-06 PROCEDURE — 99213 OFFICE O/P EST LOW 20 MIN: CPT | Mod: 95,,, | Performed by: INTERNAL MEDICINE

## 2024-08-06 PROCEDURE — 3008F BODY MASS INDEX DOCD: CPT | Mod: CPTII,95,, | Performed by: INTERNAL MEDICINE

## 2024-08-06 RX ORDER — HYDROXYZINE PAMOATE 25 MG/1
25 CAPSULE ORAL EVERY 8 HOURS PRN
Qty: 60 CAPSULE | Refills: 5 | Status: SHIPPED | OUTPATIENT
Start: 2024-08-06

## 2024-08-09 PROBLEM — F41.1 GAD (GENERALIZED ANXIETY DISORDER): Status: ACTIVE | Noted: 2024-08-09

## 2024-10-03 ENCOUNTER — TELEPHONE (OUTPATIENT)
Dept: FAMILY MEDICINE | Facility: CLINIC | Age: 24
End: 2024-10-03
Payer: COMMERCIAL

## 2024-10-03 NOTE — TELEPHONE ENCOUNTER
----- Message from Erika sent at 10/3/2024  7:49 AM CDT -----  Contact: self  .Type:  Needs Medical Advice    Who Called: .Lisseth Rivas  Would the patient rather a call back or a response via [facility]? Call back   Best Call Back Number: .[phone]  Additional Information: pt is asking for an return call in reference to seeing if Dr. Apple could refer her fiance to an provider to have his Testerone levels checked

## 2025-04-09 ENCOUNTER — HOSPITAL ENCOUNTER (OUTPATIENT)
Dept: RADIOLOGY | Facility: HOSPITAL | Age: 25
Discharge: HOME | End: 2025-04-09
Attending: INTERNAL MEDICINE
Payer: COMMERCIAL

## 2025-04-09 ENCOUNTER — CLINICAL SUPPORT (OUTPATIENT)
Dept: FAMILY MEDICINE | Facility: CLINIC | Age: 25
End: 2025-04-09
Payer: COMMERCIAL

## 2025-04-09 ENCOUNTER — TELEPHONE (OUTPATIENT)
Dept: PRIMARY CARE CLINIC | Facility: CLINIC | Age: 25
End: 2025-04-09
Payer: COMMERCIAL

## 2025-04-09 ENCOUNTER — OFFICE VISIT (OUTPATIENT)
Dept: PRIMARY CARE CLINIC | Facility: CLINIC | Age: 25
End: 2025-04-09
Payer: COMMERCIAL

## 2025-04-09 ENCOUNTER — RESULTS FOLLOW-UP (OUTPATIENT)
Dept: PRIMARY CARE CLINIC | Facility: CLINIC | Age: 25
End: 2025-04-09

## 2025-04-09 VITALS
WEIGHT: 160.94 LBS | HEIGHT: 64 IN | BODY MASS INDEX: 27.48 KG/M2 | SYSTOLIC BLOOD PRESSURE: 122 MMHG | DIASTOLIC BLOOD PRESSURE: 70 MMHG | HEART RATE: 82 BPM | TEMPERATURE: 98 F | OXYGEN SATURATION: 99 %

## 2025-04-09 DIAGNOSIS — E04.1 THYROID NODULE: Primary | ICD-10-CM

## 2025-04-09 DIAGNOSIS — Z13.6 ENCOUNTER FOR LIPID SCREENING FOR CARDIOVASCULAR DISEASE: ICD-10-CM

## 2025-04-09 DIAGNOSIS — Z13.220 ENCOUNTER FOR LIPID SCREENING FOR CARDIOVASCULAR DISEASE: ICD-10-CM

## 2025-04-09 DIAGNOSIS — R07.9 CHEST PAIN, UNSPECIFIED TYPE: ICD-10-CM

## 2025-04-09 DIAGNOSIS — Z00.00 ENCOUNTER FOR ANNUAL HEALTH EXAMINATION: ICD-10-CM

## 2025-04-09 DIAGNOSIS — E01.0 THYROMEGALY: ICD-10-CM

## 2025-04-09 DIAGNOSIS — F41.1 GAD (GENERALIZED ANXIETY DISORDER): ICD-10-CM

## 2025-04-09 DIAGNOSIS — R07.9 CHEST PAIN, UNSPECIFIED TYPE: Primary | ICD-10-CM

## 2025-04-09 DIAGNOSIS — Z13.1 SCREENING FOR DIABETES MELLITUS: ICD-10-CM

## 2025-04-09 LAB
OHS QRS DURATION: 96 MS
OHS QTC CALCULATION: 404 MS

## 2025-04-09 PROCEDURE — 71046 X-RAY EXAM CHEST 2 VIEWS: CPT | Mod: 26,,, | Performed by: RADIOLOGY

## 2025-04-09 PROCEDURE — 93005 ELECTROCARDIOGRAM TRACING: CPT | Mod: S$GLB,,, | Performed by: INTERNAL MEDICINE

## 2025-04-09 PROCEDURE — 99999 PR PBB SHADOW E&M-EST. PATIENT-LVL I: CPT | Mod: PBBFAC,,,

## 2025-04-09 PROCEDURE — 71046 X-RAY EXAM CHEST 2 VIEWS: CPT | Mod: TC,PO

## 2025-04-09 PROCEDURE — 93010 ELECTROCARDIOGRAM REPORT: CPT | Mod: S$GLB,,, | Performed by: INTERNAL MEDICINE

## 2025-04-09 PROCEDURE — 76536 US EXAM OF HEAD AND NECK: CPT | Mod: TC,PO

## 2025-04-09 PROCEDURE — 99999 PR PBB SHADOW E&M-EST. PATIENT-LVL IV: CPT | Mod: PBBFAC,,, | Performed by: INTERNAL MEDICINE

## 2025-04-09 PROCEDURE — 76536 US EXAM OF HEAD AND NECK: CPT | Mod: 26,,, | Performed by: RADIOLOGY

## 2025-04-09 RX ORDER — FLUOXETINE HYDROCHLORIDE 20 MG/1
20 CAPSULE ORAL DAILY
Qty: 30 CAPSULE | Refills: 1 | Status: SHIPPED | OUTPATIENT
Start: 2025-04-09 | End: 2026-04-09

## 2025-04-10 NOTE — TELEPHONE ENCOUNTER
There is a nodule on the thyroid did have some suspicious findings and radiology is recommending a biopsy. Orders placed.    Chest xray and EKG are normal. Labs thus far are all normal.    I have signed for the following orders AND/OR meds.  Please call the patient and ask the patient to schedule the testing AND/OR inform about any medications that were sent. Medications have been sent to pharmacy listed below      Orders Placed This Encounter   Procedures    US FNA Thyroid, 1st Lesion     Standing Status:   Future     Expected Date:   4/9/2025     Expiration Date:   4/9/2026     May the Radiologist modify the order per protocol to meet the clinical needs of the patient?:   Yes     Release to patient:   Immediate              [id number] - [facility] [address] AT [facility] OF [facility] [address]  [address] [facility] [address]  Phone: [phone] Fax: [phone]    [id number] [place] - [address] [facility]  [address] [facility] [address]  Phone: [phone] Fax: [phone]    [id number] [place] - [address] [facility]  [address] [facility]  [place] [address]  Phone: [phone] Fax: [phone]

## 2025-04-11 ENCOUNTER — PATIENT MESSAGE (OUTPATIENT)
Dept: PRIMARY CARE CLINIC | Facility: CLINIC | Age: 25
End: 2025-04-11
Payer: COMMERCIAL

## 2025-04-17 ENCOUNTER — HOSPITAL ENCOUNTER (OUTPATIENT)
Dept: RADIOLOGY | Facility: HOSPITAL | Age: 25
Discharge: HOME | End: 2025-04-17
Attending: INTERNAL MEDICINE
Payer: COMMERCIAL

## 2025-04-17 DIAGNOSIS — E04.1 THYROID NODULE: ICD-10-CM

## 2025-04-17 PROCEDURE — C1729 CATH, DRAINAGE: HCPCS

## 2025-04-17 NOTE — H&P
[facility])  History & Physical    Subjective:      Chief Complaint/Reason for Admission: thyroid nodule    Lisseth Rivas is a 24 y.o. female.    Past Medical History:   Diagnosis Date    Allergic rhinitis     Depression      No past surgical history on file.  Social History[1]    (Not in a hospital admission)    Review of patient's allergies indicates:   Allergen Reactions    Corticosteroids (glucocorticoids) Swelling    Cigarette smoke Hives     Coughing        Review of Systems   Constitutional: Negative.    Respiratory: Negative.     Musculoskeletal: Negative.        Objective:      Vital Signs (Most Recent)       Vital Signs Range (Last 24H):  BP: ()/()   Arterial Line BP: ()/()     Physical Exam  Eyes:      Extraocular Movements: Extraocular movements intact.   Pulmonary:      Effort: Pulmonary effort is normal.   Musculoskeletal:      Cervical back: Normal range of motion.   Neurological:      Mental Status: She is alert and oriented to person, place, and time.         Data Review:  CBC:   Lab Results   Component Value Date    WBC 11.79 04/09/2025    RBC 4.90 04/09/2025    RBC 4.31 11/04/2020    HGB 14.1 04/09/2025    HGB 13.3 11/04/2020    HCT 43.4 04/09/2025    HCT 38.3 11/04/2020     04/09/2025     11/04/2020      ECG: no prior ECG.     Assessment:      There are no hospital problems to display for this patient.      Plan:    US FNA Thyroid           [1]   Social History  Tobacco Use    Smoking status: Never    Smokeless tobacco: Never   Substance Use Topics    Alcohol use: Yes    Drug use: Not Currently

## 2025-04-17 NOTE — DISCHARGE SUMMARY
[facility]  Discharge Note  Short Stay    US FNA Thyroid, 1st Lesion      OUTCOME: Patient tolerated treatment/procedure well without complication and is now ready for discharge.    DISPOSITION: Home or Self Care    FINAL DIAGNOSIS:  <principal problem not specified>    FOLLOWUP: In clinic    DISCHARGE INSTRUCTIONS:  No discharge procedures on file.     TIME SPENT ON DISCHARGE: 15 minutes    Pre Op Diagnosis: thyroid nodule     Post Op Diagnosis: same     Procedure:  FNA     Procedure performed by: Destin MORENO, Analisa URRUTIA     Written Informed Consent Obtained: Yes     Specimen Removed:  yes     Estimated Blood Loss:  minimal     Findings: Local anesthesia     Sedation:  no     The patient tolerated the procedure well and there were no complications.      Disposition:  F/U in clinic or with ordering physician    Discharge instructions:  Light activity for 24 hours.  Remove band aid in 24 hours.  No baths (showers are appropriate).      Sterile technique was performed in the left anterior neck, lidocaine was used as a local anesthetic.  Multiple samples taken percutaneously from the left thyroid nodule.  Pt tolerated the procedure well without immediate complications.  Please see radiologist report for details. F/u with PCP and/or ordering physician.

## 2025-04-22 NOTE — PROGRESS NOTES
Assessment/Plan:    Patient here for annual wellness exam. Health maintenance was reviewed and ordered.    Complete history and physical was completed today.  Complete and thorough medication reconciliation was performed.  Discussed risks and benefits of medications.  Advised patient on orders and health maintenance. Continue current medications listed on your summary sheet.    All questions were answered. Patient had no further concerns. Advised of diagnoses and plan, as listed below.    1. Chest pain, unspecified type   -atypical chest pain symptoms   -suspect 2/2 anxiety but will get baseline EKG and cxr  -     IN OFFICE EKG 12-LEAD (to Muse); Future  -     TSH; Future; Expected date: 04/09/2025  -     X-Ray Chest PA And Lateral; Future; Expected date: 04/09/2025    2. Encounter for annual health examination  -     CBC Auto Differential; Standing  -     Hemoglobin A1C; Standing  -     Lipid Panel; Standing    3. Encounter for lipid screening for cardiovascular disease  -     Hemoglobin A1C; Standing    4. Screening for diabetes mellitus    5. STEPHEN (generalized anxiety disorder)  Overview:  -chronic  -previously on lexapro for treatment but not currently on a daily medication  -she has still been occasionally taking hydroxyzine prn for anxiety symptoms  -recent worsening symptoms and she would like to try a different SSRI  -plan to start trial prozac 20 mg QD  -discussed risk of discontinuing this medication without tapering once on established dose  -patient was educated, advised of side effects, and all questions were answered.  Patient voiced understanding  -patient will follow up routinely and notify us if having any side effects or worsening or persistent symptoms.  ER precautions were given.    Orders:  -     FLUoxetine 20 MG capsule; Take 1 capsule (20 mg total) by mouth once daily.  Dispense: 30 capsule; Refill: 1    6. Thyromegaly   -noted on exam today   -denies any known hx of thyroid disease   -will  obtain US and thyroid studies  -     US Thyroid; Future; Expected date: 04/09/2025  -     T4, Free; Future; Expected date: 04/09/2025  -     T3; Future; Expected date: 04/09/2025        Follow up in about 4 weeks (around 5/7/2025) for virtual f/u me.    Jeanette Apple MD       WELLNESS EXAM      Patient ID: Lisseth Rivas is a 24 y.o. female.  has a past medical history of Allergic rhinitis and Depression.     Chief Complaint:  Encounter for wellness exam    History of Present Illness  The patient presents for evaluation of chest pain, anxiety and depression, and thyroid enlargement.    She has been experiencing chest discomfort, which she attributes to her anxiety. She reports that her chest pain was particularly severe on Monday, coinciding with a stressful event involving her mother-in-law. She also mentions that she was in a state of panic during this episode but managed to attend work despite the struggle. She has communicated her chest pain symptoms to her employer, who has advised her to seek immediate medical attention if the pain intensifies. She has been under significant stress due to familial issues since December 2024, which have recently escalated. She is currently on hydroxyzine, which she has been using frequently. On Monday, she took two doses of hydroxyzine due to severe chest pain.    She has been experiencing symptoms of depression, including a lack of motivation to engage in activities and feelings of social isolation. She has not tried any other medications for her condition. She is considering resuming her medication regimen for anxiety. She was previously prescribed Lexapro but discontinued it due to adverse effects.    No other new complaints today.  Remaining chronic conditions have been reviewed and remain stable. Further detail as stated above.      Health Maintenance reviewed - Annual labs ordered.     Health Maintenance Topics with due status: Not Due       Topic Last Completion Date     Pap Smear 02/28/2024    RSV Vaccine (Age 60+ and Pregnant patients) Not Due        Past Medical History:  Past Medical History:   Diagnosis Date    Allergic rhinitis     Depression      History reviewed. No pertinent surgical history.  Review of patient's allergies indicates:   Allergen Reactions    Corticosteroids (glucocorticoids) Swelling    Cigarette smoke Hives     Coughing     Medications Ordered Prior to Encounter[1]  Social History[2]  Family History   Problem Relation Name Age of Onset    Asthma Mother Tigist Rivas     Anxiety disorder Father Santos Rivas     Asthma Brother Mickey Rivas     Diabetes Maternal Grandmother Dorcas Addison     Diabetes Paternal Grandfather Blair Rivas     Asthma Paternal Grandfather Blair Rivas        Review of Systems   Constitutional:  Negative for chills, fever and malaise/fatigue.   HENT:  Negative for congestion and sore throat.    Eyes:  Negative for blurred vision and double vision.   Respiratory:  Negative for cough and shortness of breath.    Cardiovascular:  Positive for chest pain. Negative for palpitations and leg swelling.   Gastrointestinal:  Negative for abdominal pain, constipation and diarrhea.   Genitourinary:  Negative for dysuria and frequency.   Musculoskeletal:  Negative for back pain and joint pain.   Neurological:  Negative for headaches.   Psychiatric/Behavioral:  Positive for depression. Negative for suicidal ideas. The patient is nervous/anxious.          Objective:      Vitals:    04/09/25 1019   BP: 122/70   Pulse: 82   Temp: 97.9 °F (36.6 °C)     Body mass index is 27.62 kg/m².     Physical Exam  Constitutional:       General: She is not in acute distress.     Appearance: Normal appearance. She is well-developed.   HENT:      Head: Normocephalic and atraumatic.   Eyes:      Conjunctiva/sclera: Conjunctivae normal.   Neck:      Comments: Thyromegaly  Cardiovascular:      Rate and Rhythm: Normal rate and regular rhythm.       Pulses: Normal pulses.      Heart sounds: Normal heart sounds. No murmur heard.  Pulmonary:      Effort: Pulmonary effort is normal. No respiratory distress.      Breath sounds: Normal breath sounds.   Abdominal:      General: Bowel sounds are normal. There is no distension.      Palpations: Abdomen is soft.      Tenderness: There is no abdominal tenderness.   Musculoskeletal:         General: Normal range of motion.      Cervical back: Normal range of motion and neck supple.   Skin:     General: Skin is warm and dry.      Findings: No rash.   Neurological:      General: No focal deficit present.      Mental Status: She is alert and oriented to person, place, and time.   Psychiatric:         Mood and Affect: Mood normal.         Behavior: Behavior normal.         Thought Content: Thought content normal.         Judgment: Judgment normal.         All labs, imaging and procedures performed since last visit have been personally reviewed.    DISCLAIMER: This note was compiled by using a speech recognition dictation system and therefore please be aware that typographical / speech recognition errors can and do occur.  Please contact me if you see any errors specifically.  Consent was obtained for CLAUDIA recording system prior to the visit.         [1]  Current Outpatient Medications on File Prior to Visit   Medication Sig Dispense Refill    hydrOXYzine pamoate (VISTARIL) 25 MG Cap Take 1 capsule (25 mg total) by mouth every 8 (eight) hours as needed (anxiety). 60 capsule 5     No current facility-administered medications on file prior to visit.   [2]  Social History  Socioeconomic History    Marital status: Single   Tobacco Use    Smoking status: Never    Smokeless tobacco: Never   Substance and Sexual Activity    Alcohol use: Yes    Drug use: Not Currently    Sexual activity: Yes     Partners: Male     Birth control/protection: Injection   Social History Narrative    Lives with mother, who smokes outside and  occasionally in the house, 1 brother and 1 dog.     Social Drivers of Health     Financial Resource Strain: Low Risk  (4/8/2025)    Overall Financial Resource Strain (CARDIA)     Difficulty of Paying Living Expenses: Not very hard   Food Insecurity: No Food Insecurity (4/8/2025)    Hunger Vital Sign     Worried About Running Out of Food in the Last Year: Never true     Ran Out of Food in the Last Year: Never true   Transportation Needs: No Transportation Needs (4/8/2025)    PRAPARE - Transportation     Lack of Transportation (Medical): No     Lack of Transportation (Non-Medical): No   Physical Activity: Sufficiently Active (4/8/2025)    Exercise Vital Sign     Days of Exercise per Week: 4 days     Minutes of Exercise per Session: 60 min   Stress: No Stress Concern Present (4/8/2025)    [employer] - Occupational Stress Questionnaire     Feeling of Stress : Not at all   Housing Stability: Low Risk  (4/8/2025)    Housing Stability Vital Sign     Unable to Pay for Housing in the Last Year: No     Number of Times Moved in the Last Year: 0     Homeless in the Last Year: No

## 2025-04-24 ENCOUNTER — TELEPHONE (OUTPATIENT)
Dept: PRIMARY CARE CLINIC | Facility: CLINIC | Age: 25
End: 2025-04-24
Payer: COMMERCIAL

## 2025-04-24 ENCOUNTER — RESULTS FOLLOW-UP (OUTPATIENT)
Dept: PRIMARY CARE CLINIC | Facility: CLINIC | Age: 25
End: 2025-04-24

## 2025-04-24 DIAGNOSIS — E04.1 THYROID NODULE: Primary | ICD-10-CM

## 2025-04-24 NOTE — TELEPHONE ENCOUNTER
I spoke with patient about recent thyroid pathology results. Results suspicious for malignancy. She is aware that I will be referring to ENT. I would like to see if she can get in to see Dr. Cornelius but if she is not available soon, ok to schedule with a different [facility], per patient's location preference.    I have signed for the following orders AND/OR meds.  Please call the patient and ask the patient to schedule the testing AND/OR inform about any medications that were sent. Medications have been sent to pharmacy listed below      Orders Placed This Encounter   Procedures    Ambulatory referral/consult to ENT     Standing Status:   Future     Expected Date:   5/1/2025     Expiration Date:   5/24/2026     Referral Priority:   Routine     Referral Type:   Consultation     Referral Reason:   Specialty Services Required     Referred to Provider:   Abbey Cornelius MD     Requested Specialty:   Otolaryngology     Number of Visits Requested:   1              [id number] - [place] [address] AT [facility] [address] [facility] [address] [place]  [address] [facility] [address]  Phone: [phone] Fax: [phone]    [id number] - [place], [place] - [address] [facility]  [address] [facility] [address]  Phone: [phone] Fax: [phone]    [id number] - [place], [place] - [address] [facility]  [address] [place] [facility] [address]  Phone: [phone] Fax: [phone]

## 2025-04-24 NOTE — TELEPHONE ENCOUNTER
04/24/2025 2:23 PM   I spoke with the patient about this. Appt scheduled for Monday morning with Dr. Cornelius.

## 2025-04-28 ENCOUNTER — OFFICE VISIT (OUTPATIENT)
Dept: OTOLARYNGOLOGY | Facility: CLINIC | Age: 25
End: 2025-04-28
Payer: COMMERCIAL

## 2025-04-28 VITALS — WEIGHT: 157.19 LBS | BODY MASS INDEX: 26.84 KG/M2 | HEIGHT: 64 IN

## 2025-04-28 DIAGNOSIS — E04.1 THYROID NODULE: ICD-10-CM

## 2025-04-28 DIAGNOSIS — C73 PAPILLARY THYROID CARCINOMA: Primary | ICD-10-CM

## 2025-04-28 PROCEDURE — 3008F BODY MASS INDEX DOCD: CPT | Mod: CPTII,S$GLB,, | Performed by: STUDENT IN AN ORGANIZED HEALTH CARE EDUCATION/TRAINING PROGRAM

## 2025-04-28 PROCEDURE — 3044F HG A1C LEVEL LT 7.0%: CPT | Mod: CPTII,S$GLB,, | Performed by: STUDENT IN AN ORGANIZED HEALTH CARE EDUCATION/TRAINING PROGRAM

## 2025-04-28 PROCEDURE — 99204 OFFICE O/P NEW MOD 45 MIN: CPT | Mod: 25,S$GLB,, | Performed by: STUDENT IN AN ORGANIZED HEALTH CARE EDUCATION/TRAINING PROGRAM

## 2025-04-28 PROCEDURE — 1159F MED LIST DOCD IN RCRD: CPT | Mod: CPTII,S$GLB,, | Performed by: STUDENT IN AN ORGANIZED HEALTH CARE EDUCATION/TRAINING PROGRAM

## 2025-04-28 PROCEDURE — 99999 PR PBB SHADOW E&M-EST. PATIENT-LVL III: CPT | Mod: PBBFAC,,, | Performed by: STUDENT IN AN ORGANIZED HEALTH CARE EDUCATION/TRAINING PROGRAM

## 2025-04-28 PROCEDURE — 31575 DIAGNOSTIC LARYNGOSCOPY: CPT | Mod: S$GLB,,, | Performed by: STUDENT IN AN ORGANIZED HEALTH CARE EDUCATION/TRAINING PROGRAM

## 2025-04-28 NOTE — PROGRESS NOTES
Otolaryngology Clinic Note    Subjective:       Patient ID: Lisseth Rivas is a 24 y.o. female.    Chief Complaint: Thyroid Problem      History of Present Illness: Lisseth Rivas is a 24 y.o. female presenting with new thyroid nodule, bx + papillary. PCP felt nodule. No swallowing issues, sometimes voice hoarse. No radiation exposure. No other medical issues. Reports OB told her to have US last year, but she did not.   Grandma had colon cancer.   TSH 1.553     Free T4 0.98     T3, Total 113         No past surgical history on file.  Past Medical History:   Diagnosis Date    Allergic rhinitis     Depression      Social Drivers of Health     Tobacco Use: Low Risk  (4/9/2025)    Patient History     Smoking Tobacco Use: Never     Smokeless Tobacco Use: Never     Passive Exposure: Not on file   Alcohol Use: Not At Risk (4/8/2025)    AUDIT-C     Frequency of Alcohol Consumption: 2-4 times a month     Average Number of Drinks: 1 or 2     Frequency of Binge Drinking: Never   Financial Resource Strain: Low Risk  (4/8/2025)    Overall Financial Resource Strain (CARDIA)     Difficulty of Paying Living Expenses: Not very hard   Food Insecurity: No Food Insecurity (4/8/2025)    Hunger Vital Sign     Worried About Running Out of Food in the Last Year: Never true     Ran Out of Food in the Last Year: Never true   Transportation Needs: No Transportation Needs (4/8/2025)    PRAPARE - Transportation     Lack of Transportation (Medical): No     Lack of Transportation (Non-Medical): No   Physical Activity: Sufficiently Active (4/8/2025)    Exercise Vital Sign     Days of Exercise per Week: 4 days     Minutes of Exercise per Session: 60 min   Stress: No Stress Concern Present (4/8/2025)    [employer] - Occupational Stress Questionnaire     Feeling of Stress : Not at all   Housing Stability: Low Risk  (4/8/2025)    Housing Stability Vital Sign     Unable to Pay for Housing in the Last Year: No      Number of Times Moved in the Last Year: 0     Homeless in the Last Year: No   Depression: Low Risk  (4/9/2025)    Depression     Last PHQ-4: Flowsheet Data: 0   Utilities: Not At Risk (4/8/2025)    [facility] Utilities     Threatened with loss of utilities: No   Health Literacy: Adequate Health Literacy (4/8/2025)     Health Literacy     Frequency of need for help with medical instructions: Never   Social Isolation: Not on file     Review of patient's allergies indicates:   Allergen Reactions    Corticosteroids (glucocorticoids) Swelling    Cigarette smoke Hives     Coughing     Current Outpatient Medications   Medication Instructions    FLUoxetine 20 mg, Oral, Daily    hydrOXYzine pamoate (VISTARIL) 25 mg, Oral, Every 8 hours PRN         ENT ROS negative except as stated above.     Patient answers are not available for this visit.            Objective:      There were no vitals filed for this visit.    General: NAD, well appearing  Eyes: Normal conjunctiva and lids  Face: symmetric, nerve intact  Nose: The nose is without any evidence of any deformity. The nasal mucosa is moist. The septum is midline. There is no evidence of septal hematoma. The turbinates are without abnormality.   Ears: The ears are with normal-appearing pinna. Examination of the canals is normal appearing bilaterally. There is no drainage or erythema noted. The tympanic membranes are normal appearing with pearly color, normal-appearing landmarks and normal light reflex. Hearing is grossly intact.  Mouth: No obvious abnormalities to the lips. The teeth are unremarkable. The gingivae are without any obvious evidence of infection or lesion. The oral mucosa is moist and pink. There are no obvious masses to the hard or soft palate.   Oropharynx: The uvula is midline.  The tongue is midline. The posterior pharynx is without erythema or exudate. The tonsils are normal appearing.  Salivary glands: The salivary glands are symmetric and not enlarged, no  masses  Neck: No lymphadenopathy, trachea midline, thryoid has 3.5 cm nodule mid lobe on left.  Psych: Normal mood and affect.   Neuro: Grossly intact  Speech: fluent    Procedure: Flexible laryngoscopy  Indications: pre op  Verbal consent obtained  Anesthesia: lidocaine and phenylephrine nasal spray  Procedure: Scope was passed into right or left nare, to nasopharynx and down to visualize the glottis. Findings below. Patient tolerated procedure well.     - Nose WNL  - Nasopharynx- WNL, no masses  - Oropharynx- BOT symmetric, no masses  - Hypopharynx and piriform sinuses- no masses on trumpet maneuver  - Supraglottis- Arytenoids intact, no masses, not edematous or erythematous  - Glottis- Bilateral vocal folds intact with full motion, no masses  - Glottic closure- complete      Test Review: I personally reviewed thyroid US    EXAMINATION:  US THYROID     CLINICAL HISTORY:  Iodine-deficiency related diffuse (endemic) goiter     TECHNIQUE:  Ultrasound of the thyroid and cervical lymph nodes was performed.     COMPARISON:  None.     FINDINGS:  The right lobe of the thyroid gland measures up to 4.9 cm.  The left lobe measures up to 5.4 cm in length.  The gland is homogeneous in echotexture.  There is a mildly hypoechoic 3.6 x 2.0 x 2.2 cm nodule occupying the majority of the midpole of the left lobe of the thyroid gland that demonstrates punctate echogenic foci.     Impression:     1. Suspicious appearing solid nodule within the left lobe of the thyroid gland meeting criteria for FNA per TI rads criteria.        Electronically signed by:Dennys Hein DO  Date:                                            04/09/2025    Final Diagnosis   1: Thyroid, left, fine needle aspiration (thin prep and smear slides):              Satisfactory for evaluation             [facility] Thyroid Cytology Category V: Suspicious for Malignancy - Suspect papillary thyroid carcinoma                        Dr. NEELA Vanegas has reviewed this  case and agrees with the above interpretation.         Assessment and Plan:       1. Papillary thyroid carcinoma    2. Thyroid nodule            Recommend total thyroidectomy based on nearly being 4 cm and her age. She will need thyroid supplementation lifelong. She agrees.   I explained the risks of thyroidectomy include, but are not limited to, infection, bleeding, scarring, failure to achieve the diagnosis, no evidence of cancer, recurrence, collection of blood or tissue fluid requiring drainage, injury to the recurrent laryngeal nerve with resultant temporary or permanent hoarseness (1% permanent risk with up to 10% temporary risk, greater in revision operations), injury to the superior laryngeal nerve with resultant loss of the upper register for singing or challenges with yelling, temporary or permanent hypocalcemia related to injury or devascularization of the parathyroid glands (less than 5% permanent, up to 30-60% when paratracheal dissection is accomplished, again greater in revision operations), need for tracheostomy in case of bilateral cord weakness, and the need for additional procedures or therapies. Time was allowed for questions, and all questions were answered to the patient's apparent satisfaction. The risks of paratracheal lymph node dissection are included above.     she is aware that, if malignancy is detected, then she may require additional therapy.    1 night stay [facility]    No lymph nodes noted in neck, imaging on US.     RTC: 1 week post op    Plan of care was discussed in detail with the patient, who agreed with the plan as above. All questions were answered in detail.     Abbey Cornelius MD  Otolaryngology

## 2025-05-06 ENCOUNTER — PATIENT MESSAGE (OUTPATIENT)
Dept: OTOLARYNGOLOGY | Facility: CLINIC | Age: 25
End: 2025-05-06
Payer: COMMERCIAL

## 2025-05-07 ENCOUNTER — OFFICE VISIT (OUTPATIENT)
Dept: PRIMARY CARE CLINIC | Facility: CLINIC | Age: 25
End: 2025-05-07
Payer: COMMERCIAL

## 2025-05-07 DIAGNOSIS — F41.1 GAD (GENERALIZED ANXIETY DISORDER): Primary | ICD-10-CM

## 2025-05-07 DIAGNOSIS — E04.1 THYROID NODULE: ICD-10-CM

## 2025-05-07 DIAGNOSIS — F32.4 MAJOR DEPRESSIVE DISORDER WITH SINGLE EPISODE, IN PARTIAL REMISSION: ICD-10-CM

## 2025-05-07 PROBLEM — F33.1 MODERATE EPISODE OF RECURRENT MAJOR DEPRESSIVE DISORDER: Status: RESOLVED | Noted: 2022-05-26 | Resolved: 2025-05-07

## 2025-05-07 PROCEDURE — 1159F MED LIST DOCD IN RCRD: CPT | Mod: CPTII,95,, | Performed by: INTERNAL MEDICINE

## 2025-05-07 PROCEDURE — 98006 SYNCH AUDIO-VIDEO EST MOD 30: CPT | Mod: 95,,, | Performed by: INTERNAL MEDICINE

## 2025-05-07 PROCEDURE — G2211 COMPLEX E/M VISIT ADD ON: HCPCS | Mod: 95,,, | Performed by: INTERNAL MEDICINE

## 2025-05-07 PROCEDURE — 3044F HG A1C LEVEL LT 7.0%: CPT | Mod: CPTII,95,, | Performed by: INTERNAL MEDICINE

## 2025-05-07 RX ORDER — FLUOXETINE HYDROCHLORIDE 40 MG/1
40 CAPSULE ORAL DAILY
Qty: 30 CAPSULE | Refills: 1 | Status: SHIPPED | OUTPATIENT
Start: 2025-05-07 | End: 2025-06-06

## 2025-05-07 NOTE — PROGRESS NOTES
Primary Care Telemedicine Note  The patient location is:  Patient Home   The chief complaint leading to consultation is: anxiety follow up  Total time spent with patient: 20 min    Visit type: Virtual visit with synchronous audio and video  Each patient to whom he or she provides medical services by telemedicine is:  (1) informed of the relationship between the physician and patient and the respective role of any other health care provider with respect to management of the patient; and (2) notified that he or she may decline to receive medical services by telemedicine and may withdraw from such care at any time.    Assessment/Plan:    1. STEPHEN (generalized anxiety disorder)  Overview:  -chronic  -previously on lexapro for treatment in past but she was unsure of effectiveness of that medication  -started on prozac 20 mg QD with some improvement of symptoms but requesting to try increased dose  -denies any AE of medication thus far  -plan to increase dose to 40 mg QD  -discussed risk of discontinuing this medication without tapering once on established dose  -patient was educated, advised of side effects, and all questions were answered.  Patient voiced understanding  -patient will follow up routinely and notify us if having any side effects or worsening or persistent symptoms.  ER precautions were given.    Orders:  -     FLUoxetine 40 MG capsule; Take 1 capsule (40 mg total) by mouth once daily.  Dispense: 30 capsule; Refill: 1    2. Major depressive disorder with single episode, in partial remission  -     FLUoxetine 40 MG capsule; Take 1 capsule (40 mg total) by mouth once daily.  Dispense: 30 capsule; Refill: 1    3. Thyroid nodule  Overview:  -abnormal thyroid exam during annual physical last month  -US of thyroid revealed nodule with concerning features  -biopsy 4/9/25- suspicious for papillary thyroid carcinoma  -established with ENT and plan for total thyroidectomy 5/15/25          Visit today included  increased complexity associated with the care of the episodic problem(s) addressed and managing the longitudinal care of the patient due to the serious and/or complex managed problem(s). See above assessment/plan.    Follow up in about 4 weeks (around 6/4/2025) for virtual f/u me.    Jeanette Apple MD  _____________________________________________________________________________________________________________________________________________________    HPI:    Patient is an established patient who presents today via virtual visit.    History of Present Illness  Patient reports that she has seen some improvement in anxiety/depression symptoms since starting Prozac, however still experiencing some residual anxiety symptoms. She believes some of this is related to her ongoing thyroid issues and upcoming surgery, however anxiety had started prior to learning of her thyroid diagnosis. She has tolerated the Prozac without side effects thus far. She is interested in trying a higher dose of the Prozac.     No other new complaints today.      Past Medical History:  Past Medical History:   Diagnosis Date    Allergic rhinitis     Depression     Seasonal allergies        Medications Ordered Prior to Encounter[1]    Review of Systems   Constitutional:  Negative for chills, fever and malaise/fatigue.   HENT:  Negative for hearing loss.    Eyes:  Negative for discharge.   Respiratory:  Negative for cough, shortness of breath and wheezing.    Cardiovascular:  Negative for chest pain, palpitations and leg swelling.   Gastrointestinal:  Negative for abdominal pain, blood in stool, constipation, diarrhea and vomiting.   Genitourinary:  Negative for dysuria, frequency and hematuria.   Musculoskeletal:  Positive for neck pain. Negative for back pain and joint pain.   Neurological:  Positive for headaches. Negative for weakness.   Endo/Heme/Allergies:  Negative for polydipsia.   Psychiatric/Behavioral:  Negative for depression. The  patient is nervous/anxious. The patient does not have insomnia.          Physical Exam   There were no vitals filed for this visit.   .FLOWAMB[14   BMI Readings from Last 1 Encounters:   04/28/25 26.98 kg/m²       Physical Exam  Constitutional:       General: She is not in acute distress.     Appearance: She is well-developed.   HENT:      Head: Normocephalic and atraumatic.   Pulmonary:      Effort: Pulmonary effort is normal. No respiratory distress.   Abdominal:      General: There is no distension.   Musculoskeletal:         General: Normal range of motion.      Cervical back: Normal range of motion.   Neurological:      Mental Status: She is alert and oriented to person, place, and time.   Psychiatric:         Mood and Affect: Mood normal.         Behavior: Behavior normal.         Thought Content: Thought content normal.         Judgment: Judgment normal.           DISCLAIMER: This note was compiled by using a speech recognition dictation system and therefore please be aware that typographical / speech recognition errors can and do occur.  Please contact me if you see any errors specifically.  Consent was obtained for CLAUDIA recording system prior to the visit.  Answers submitted by the patient for this visit:  Review of Systems Questionnaire (Submitted on 4/30/2025)  activity change: No  unexpected weight change: Yes  rhinorrhea: No  trouble swallowing: No  visual disturbance: No  chest tightness: No  polyuria: No  difficulty urinating: No  menstrual problem: No  joint swelling: No  arthralgias: No  confusion: No  dysphoric mood: No         [1]   Current Outpatient Medications on File Prior to Visit   Medication Sig Dispense Refill    hydrOXYzine pamoate (VISTARIL) 25 MG Cap Take 1 capsule (25 mg total) by mouth every 8 (eight) hours as needed (anxiety). 60 capsule 5    [DISCONTINUED] FLUoxetine 20 MG capsule Take 1 capsule (20 mg total) by mouth once daily. 30 capsule 1     No current facility-administered  medications on file prior to visit.

## 2025-05-16 ENCOUNTER — PATIENT MESSAGE (OUTPATIENT)
Dept: OTOLARYNGOLOGY | Facility: CLINIC | Age: 25
End: 2025-05-16
Payer: COMMERCIAL

## 2025-05-19 ENCOUNTER — RESULTS FOLLOW-UP (OUTPATIENT)
Dept: OTOLARYNGOLOGY | Facility: CLINIC | Age: 25
End: 2025-05-19

## 2025-05-20 ENCOUNTER — TELEPHONE (OUTPATIENT)
Dept: ENDOCRINOLOGY | Facility: CLINIC | Age: 25
End: 2025-05-20
Payer: COMMERCIAL

## 2025-05-20 NOTE — TELEPHONE ENCOUNTER
----- Message from Abbey Cornelius MD sent at 5/19/2025  3:06 PM CDT -----  New thyroid cancer. Did a total. Can you get her in? Thanks!!  ----- Message -----  From: Interface, Lab In [facility]  Sent: 5/19/2025   2:57 PM CDT  To: Abbey Cornelius MD

## 2025-05-21 ENCOUNTER — OFFICE VISIT (OUTPATIENT)
Dept: OTOLARYNGOLOGY | Facility: CLINIC | Age: 25
End: 2025-05-21
Payer: COMMERCIAL

## 2025-05-21 VITALS — HEIGHT: 64 IN | BODY MASS INDEX: 26.72 KG/M2 | WEIGHT: 156.5 LBS

## 2025-05-21 DIAGNOSIS — C73 PAPILLARY THYROID CARCINOMA: Primary | ICD-10-CM

## 2025-05-21 PROCEDURE — 3044F HG A1C LEVEL LT 7.0%: CPT | Mod: CPTII,S$GLB,, | Performed by: STUDENT IN AN ORGANIZED HEALTH CARE EDUCATION/TRAINING PROGRAM

## 2025-05-21 PROCEDURE — 99999 PR PBB SHADOW E&M-EST. PATIENT-LVL III: CPT | Mod: PBBFAC,,, | Performed by: STUDENT IN AN ORGANIZED HEALTH CARE EDUCATION/TRAINING PROGRAM

## 2025-05-21 PROCEDURE — 99024 POSTOP FOLLOW-UP VISIT: CPT | Mod: S$GLB,,, | Performed by: STUDENT IN AN ORGANIZED HEALTH CARE EDUCATION/TRAINING PROGRAM

## 2025-05-21 PROCEDURE — 1159F MED LIST DOCD IN RCRD: CPT | Mod: CPTII,S$GLB,, | Performed by: STUDENT IN AN ORGANIZED HEALTH CARE EDUCATION/TRAINING PROGRAM

## 2025-05-21 NOTE — PROGRESS NOTES
ENT POST OP    TOTAL thyroid 5/19, PTH ok post op    S: pain ok, no tingling. On calcium. On synthroid.      DIAGNOSIS:   05/19/2025 JL:rosendo     THYROID, TOTAL THYROIDECTOMY:     - PAPILLARY THYROID CARCINOMA, CLASSIC TYPE.     - ONE INCIDENTAL PERITHYROIDAL LYMPH NODE, NO TUMOR SEEN (0/1)     - SEE SYNOPTIC REPORT FOR FULL CHARACTERIZATION.     CASE SUMMARY: (THYROID GLAND) Standard(s): AJCC-UICC 8         CLINICAL     Preoperative Biopsy Diagnosis: LEFT THYROID FNA: Catagory V - Suspicious    for papillary carcinoma ([id number])     SPECIMEN     Procedure     - Total thyroidectomy         TUMOR     Tumor Focality     - Unifocal     Tumor Characteristics     Tumor Site     - Left lobe     Tumor Size     - Greatest dimension in Centimeters (cm): about 3.5 cm (8 blocks x 0.4    cm/block)         Histologic Tumor Types and Subtypes     - Papillary carcinoma, classic subtype         Tumor Proliferative Activity     Mitotic Rate     - Less than 3 mitoses per 2mm2         Tumor Necrosis     - Not identified     Angioinvasion (vascular invasion)     - Not identified         Lymphatic Invasion     - Not identified         Extrathyroidal Extension     - Not identified         Margin Status     - All margins negative for carcinoma     REGIONAL LYMPH NODES     Regional Lymph Node Status      Regional lymph nodes present (incidental)     -All regional lymph nodes negative for tumor     Number of Lymph Nodes Examined     - Exact number (specify): One (1)     Michel Level Examined     - Level VI - pretracheal, paratracheal and prelaryngeal / Delphian,    perithyroidal (central compartment dissection)         pT Category     pT2: Tumor greater than 2 cm but less than or equal to 4 cm in greatest    dimension, limited to thyroid     pN Category     pN0a: One or more cytologically or histologically confirmed benign lymph    nodes       Exam: steris in place, c/d/I. No fluid collection  Voice good.     A/P:   Reviewed path,  pT2N0 papillary. No adjuvant treatment needed.   Will have her follow with Dr. Kimball on Monday. He will manage synthroid  Calcium taper, continue synthroid dose.   RTC PRN with me  Take steris off when peeling.     Abbey Cornelius MD

## 2025-05-23 ENCOUNTER — TELEPHONE (OUTPATIENT)
Dept: ENDOCRINOLOGY | Facility: CLINIC | Age: 25
End: 2025-05-23
Payer: COMMERCIAL

## 2025-05-26 ENCOUNTER — OFFICE VISIT (OUTPATIENT)
Dept: ENDOCRINOLOGY | Facility: CLINIC | Age: 25
End: 2025-05-26
Payer: COMMERCIAL

## 2025-05-26 VITALS
HEIGHT: 64 IN | BODY MASS INDEX: 26.1 KG/M2 | SYSTOLIC BLOOD PRESSURE: 106 MMHG | HEART RATE: 78 BPM | WEIGHT: 152.88 LBS | DIASTOLIC BLOOD PRESSURE: 62 MMHG

## 2025-05-26 DIAGNOSIS — F41.9 ANXIETY: ICD-10-CM

## 2025-05-26 DIAGNOSIS — E89.0 POSTOPERATIVE HYPOTHYROIDISM: ICD-10-CM

## 2025-05-26 DIAGNOSIS — E83.51 HYPOCALCEMIA: ICD-10-CM

## 2025-05-26 DIAGNOSIS — C73 THYROID CANCER: Primary | ICD-10-CM

## 2025-05-26 PROCEDURE — 3008F BODY MASS INDEX DOCD: CPT | Mod: CPTII,S$GLB,, | Performed by: INTERNAL MEDICINE

## 2025-05-26 PROCEDURE — 1159F MED LIST DOCD IN RCRD: CPT | Mod: CPTII,S$GLB,, | Performed by: INTERNAL MEDICINE

## 2025-05-26 PROCEDURE — 1160F RVW MEDS BY RX/DR IN RCRD: CPT | Mod: CPTII,S$GLB,, | Performed by: INTERNAL MEDICINE

## 2025-05-26 PROCEDURE — 3044F HG A1C LEVEL LT 7.0%: CPT | Mod: CPTII,S$GLB,, | Performed by: INTERNAL MEDICINE

## 2025-05-26 PROCEDURE — 3074F SYST BP LT 130 MM HG: CPT | Mod: CPTII,S$GLB,, | Performed by: INTERNAL MEDICINE

## 2025-05-26 PROCEDURE — 99999 PR PBB SHADOW E&M-EST. PATIENT-LVL III: CPT | Mod: PBBFAC,,, | Performed by: INTERNAL MEDICINE

## 2025-05-26 PROCEDURE — 3078F DIAST BP <80 MM HG: CPT | Mod: CPTII,S$GLB,, | Performed by: INTERNAL MEDICINE

## 2025-05-26 PROCEDURE — 99214 OFFICE O/P EST MOD 30 MIN: CPT | Mod: S$GLB,,, | Performed by: INTERNAL MEDICINE

## 2025-05-26 NOTE — PROGRESS NOTES
CHIEF COMPLAINT: Papillary Thyroid Cancer   24 y.o. old being seen as a new patient.  Initially discovered on physical exam. Patient had a left FNA in April of 2025 suggestive papillary thyroid cancer.  Subsequently had a total thyroidectomy on 05/15/2025- Dr Cornelius.  On a taper of Tums- currently 1000 daily.  Also started on Synthroid 112 mcg.  No Palpitations. No tremors. NO Increased insomnia. No increased anxiety. Always tend to be cold- no change.       04/17/2025  Final Diagnosis   1: Thyroid, left, fine needle aspiration (thin prep and smear slides):              Satisfactory for evaluation             [facility] Thyroid Cytology Category V: Suspicious for Malignancy - Suspect papillary thyroid carcinoma                          Total thyroidectomy 05/15/2025  TUMOR   Tumor Focality   - Unifocal   Tumor Characteristics   Tumor Site   - Left lobe   Tumor Size   - Greatest dimension in Centimeters (cm): about 3.5 cm (8 blocks x 0.4    cm/block)   Histologic Tumor Types and Subtypes   - Papillary carcinoma, classic subtype   Tumor Proliferative Activity   Mitotic Rate   - Less than 3 mitoses per 2mm2   Tumor Necrosis   - Not identified   Angioinvasion (vascular invasion)   - Not identified   Lymphatic Invasion   - Not identified   Extrathyroidal Extension   - Not identified   Margin Status   - All margins negative for carcinoma   REGIONAL LYMPH NODES   Regional Lymph Node Status    Regional lymph nodes present (incidental)   -All regional lymph nodes negative for tumor   Number of Lymph Nodes Examined  - Exact number (specify): One (1)   Michel Level Examined   - Level VI - pretracheal, paratracheal and prelaryngeal / Delphian,    perithyroidal (central compartment dissection)   pT Category   pT2: Tumor greater than 2 cm but less than or equal to 4 cm in greatest    dimension, limited to thyroid   pN Category   pN0a: One or more cytologically or histologically confirmed benign lymph    nodes         PAST  MEDICAL HISTORY/PAST SURGICAL HISTORY:  Reviewed in [facility]    SOCIAL HISTORY: Reviewed in [facility]    FAMILY HISTORY:  No known thyroid disease or thyroid cancer. MGF- DM 2.     MEDICATIONS/ALLERGIES: The patient's MedCard has been updated and reviewed.            PE:    GENERAL: Well developed, well nourished.  NECK: Supple, trachea midline, surgical scar intact.  No erythema or drainage.  No lymphadenopathy  CHEST: Resp even and unlabored, CTA bilateral.  CARDIAC: RRR, S1, S2 heard, no murmurs, rubs, S3, or S4        LABS/Radiology    ASSESSMENT/PLAN:  1.  Papillary thyroid cancer-appears to be stage I in LADY low risk.  Margins negative.  Discussed incidence of papillary thyroid cancer.  At this time does not appear to meet any indications for radioactive iodine.  Check postop thyroglobulin in 3 weeks.  Discussed if she did get radioactive iodine, should not get pregnant for approximately a year afterwards.  Can monitor with annual ultrasound.  Discussed the thyroglobulin can be detectable due to residual normal thyroid tissue.    2.  Hypothyroidism-discussed how to take thyroid medication appropriately.  Discussed symptoms of hyper and hypothyroidism.  Discussed that we will check levels in 3 weeks.  Discussed pregnancy and thyroid disease.  Ideally best to get pregnant once TFTs normalized.  Once pregnant, would let us know so we can check monthly TFTs.  Discussed risks of low thyroid hormone and impact on pregnancy.  Does not need suppressed TSH.  Can keep TSH at the lower end of normal.    3.  Anxiety has some baseline anxiety.  No increased.  Discussed that a suppressed TSH can worsen anxiety.    4.  Hypocalcemia-had some mild hypocalcemia after surgery.  On Tums taper.  Discussed symptoms of hypocalcemia.  Repeat labs in 3 weeks.      FOLLOWUP  3 weeks- Renal Panel, PTH, TSH, Tg  F/U 6 months Renal Panel, TSH, Tg

## 2025-06-03 ENCOUNTER — PATIENT MESSAGE (OUTPATIENT)
Dept: ENDOCRINOLOGY | Facility: CLINIC | Age: 25
End: 2025-06-03
Payer: COMMERCIAL

## 2025-06-04 ENCOUNTER — OFFICE VISIT (OUTPATIENT)
Dept: PRIMARY CARE CLINIC | Facility: CLINIC | Age: 25
End: 2025-06-04
Payer: COMMERCIAL

## 2025-06-04 DIAGNOSIS — F32.4 MAJOR DEPRESSIVE DISORDER WITH SINGLE EPISODE, IN PARTIAL REMISSION: ICD-10-CM

## 2025-06-04 DIAGNOSIS — F41.1 GAD (GENERALIZED ANXIETY DISORDER): ICD-10-CM

## 2025-06-04 DIAGNOSIS — E89.0 POSTOPERATIVE HYPOTHYROIDISM: ICD-10-CM

## 2025-06-04 DIAGNOSIS — C73 PAPILLARY THYROID CARCINOMA: Primary | ICD-10-CM

## 2025-06-04 PROCEDURE — G2211 COMPLEX E/M VISIT ADD ON: HCPCS | Mod: 95,,, | Performed by: INTERNAL MEDICINE

## 2025-06-04 PROCEDURE — 3044F HG A1C LEVEL LT 7.0%: CPT | Mod: CPTII,95,, | Performed by: INTERNAL MEDICINE

## 2025-06-04 PROCEDURE — 98006 SYNCH AUDIO-VIDEO EST MOD 30: CPT | Mod: 95,,, | Performed by: INTERNAL MEDICINE

## 2025-06-04 RX ORDER — FLUOXETINE HYDROCHLORIDE 40 MG/1
40 CAPSULE ORAL DAILY
Qty: 90 CAPSULE | Refills: 3 | Status: SHIPPED | OUTPATIENT
Start: 2025-06-04 | End: 2025-07-04

## 2025-06-05 ENCOUNTER — HOSPITAL ENCOUNTER (EMERGENCY)
Facility: HOSPITAL | Age: 25
Discharge: HOME | End: 2025-06-05
Attending: EMERGENCY MEDICINE
Payer: COMMERCIAL

## 2025-06-05 VITALS
WEIGHT: 156.94 LBS | DIASTOLIC BLOOD PRESSURE: 73 MMHG | TEMPERATURE: 99 F | HEART RATE: 76 BPM | BODY MASS INDEX: 26.79 KG/M2 | SYSTOLIC BLOOD PRESSURE: 123 MMHG | RESPIRATION RATE: 16 BRPM | HEIGHT: 64 IN | OXYGEN SATURATION: 100 %

## 2025-06-05 DIAGNOSIS — R53.1 WEAKNESS: Primary | ICD-10-CM

## 2025-06-05 LAB
ABSOLUTE EOSINOPHIL (OHS): 0.09 K/UL
ABSOLUTE MONOCYTE (OHS): 0.9 K/UL (ref 0.3–1)
ABSOLUTE NEUTROPHIL COUNT (OHS): 8.02 K/UL (ref 1.8–7.7)
ALBUMIN SERPL BCP-MCNC: 4.3 G/DL (ref 3.5–5.2)
ALP SERPL-CCNC: 77 UNIT/L (ref 40–150)
ALT SERPL W/O P-5'-P-CCNC: 12 UNIT/L (ref 10–44)
ANION GAP (OHS): 8 MMOL/L (ref 8–16)
AST SERPL-CCNC: 14 UNIT/L (ref 11–45)
B-HCG UR QL: NEGATIVE
BASOPHILS # BLD AUTO: 0.04 K/UL
BASOPHILS NFR BLD AUTO: 0.3 %
BILIRUB SERPL-MCNC: 0.5 MG/DL (ref 0.1–1)
BILIRUB UR QL STRIP.AUTO: NEGATIVE
BUN SERPL-MCNC: 9 MG/DL (ref 6–20)
CALCIUM SERPL-MCNC: 9.8 MG/DL (ref 8.7–10.5)
CHLORIDE SERPL-SCNC: 106 MMOL/L (ref 95–110)
CLARITY UR: CLEAR
CO2 SERPL-SCNC: 24 MMOL/L (ref 23–29)
COLOR UR AUTO: YELLOW
CREAT SERPL-MCNC: 0.7 MG/DL (ref 0.5–1.4)
ERYTHROCYTE [DISTWIDTH] IN BLOOD BY AUTOMATED COUNT: 12 % (ref 11.5–14.5)
GFR SERPLBLD CREATININE-BSD FMLA CKD-EPI: >60 ML/MIN/1.73/M2
GLUCOSE SERPL-MCNC: 95 MG/DL (ref 70–110)
GLUCOSE UR QL STRIP: NEGATIVE
HCT VFR BLD AUTO: 38.4 % (ref 37–48.5)
HCV AB SERPL QL IA: NEGATIVE
HGB BLD-MCNC: 13 GM/DL (ref 12–16)
HGB UR QL STRIP: NEGATIVE
HIV 1+2 AB+HIV1 P24 AG SERPL QL IA: NEGATIVE
HOLD SPECIMEN: NORMAL
HOLD SPECIMEN: NORMAL
IMM GRANULOCYTES # BLD AUTO: 0.05 K/UL (ref 0–0.04)
IMM GRANULOCYTES NFR BLD AUTO: 0.4 % (ref 0–0.5)
KETONES UR QL STRIP: NEGATIVE
LEUKOCYTE ESTERASE UR QL STRIP: NEGATIVE
LYMPHOCYTES # BLD AUTO: 2.89 K/UL (ref 1–4.8)
MCH RBC QN AUTO: 29.1 PG (ref 27–31)
MCHC RBC AUTO-ENTMCNC: 33.9 G/DL (ref 32–36)
MCV RBC AUTO: 86 FL (ref 82–98)
NITRITE UR QL STRIP: NEGATIVE
NUCLEATED RBC (/100WBC) (OHS): 0 /100 WBC
PH UR STRIP: 8 [PH]
PLATELET # BLD AUTO: 280 K/UL (ref 150–450)
PMV BLD AUTO: 11.7 FL (ref 9.2–12.9)
POTASSIUM SERPL-SCNC: 3.6 MMOL/L (ref 3.5–5.1)
PROT SERPL-MCNC: 8.5 GM/DL (ref 6–8.4)
PROT UR QL STRIP: NEGATIVE
RBC # BLD AUTO: 4.46 M/UL (ref 4–5.4)
RELATIVE EOSINOPHIL (OHS): 0.8 %
RELATIVE LYMPHOCYTE (OHS): 24.1 % (ref 18–48)
RELATIVE MONOCYTE (OHS): 7.5 % (ref 4–15)
RELATIVE NEUTROPHIL (OHS): 66.9 % (ref 38–73)
SODIUM SERPL-SCNC: 138 MMOL/L (ref 136–145)
SP GR UR STRIP: 1.02
UROBILINOGEN UR STRIP-ACNC: NEGATIVE EU/DL
WBC # BLD AUTO: 11.99 K/UL (ref 3.9–12.7)

## 2025-06-05 PROCEDURE — 87389 HIV-1 AG W/HIV-1&-2 AB AG IA: CPT | Performed by: EMERGENCY MEDICINE

## 2025-06-05 PROCEDURE — 86803 HEPATITIS C AB TEST: CPT | Performed by: EMERGENCY MEDICINE

## 2025-06-05 PROCEDURE — 81003 URINALYSIS AUTO W/O SCOPE: CPT | Performed by: NURSE PRACTITIONER

## 2025-06-05 PROCEDURE — 99283 EMERGENCY DEPT VISIT LOW MDM: CPT

## 2025-06-05 PROCEDURE — 82040 ASSAY OF SERUM ALBUMIN: CPT | Performed by: NURSE PRACTITIONER

## 2025-06-05 PROCEDURE — 85025 COMPLETE CBC W/AUTO DIFF WBC: CPT | Performed by: NURSE PRACTITIONER

## 2025-06-05 PROCEDURE — 81025 URINE PREGNANCY TEST: CPT | Performed by: NURSE PRACTITIONER

## 2025-06-05 NOTE — FIRST PROVIDER EVALUATION
Medical screening examination initiated.  I have conducted a focused provider triage encounter, findings are as follows:    Brief history of present illness:  reports recent thyroid procedure. Reports feels faint     Vitals:    06/05/25 1450   BP: 134/82   BP Location: Left arm   Pulse: 95   Resp: 16   Temp: 98.8 °F (37.1 °C)   TempSrc: Oral   SpO2: 98%   Weight: 71.2 kg (157 lb)       Pertinent physical exam:  nad    Brief workup plan:  labs, further eval    Preliminary workup initiated; this workup will be continued and followed by the physician or advanced practice provider that is assigned to the patient when roomed.

## 2025-06-05 NOTE — ED PROVIDER NOTES
Emergency Medicine Provider Note - 6/5/2025       SCRIBE NOTE: IAda, am scribing for, and in the presence of Mali Sousa, , FACEP     History     Chief Complaint   Patient presents with    Weakness     Pt c/o feeling weak and shaky since lunchtime today.  She also c/o numbness to her fingers.  She recently had a thyroidectomy and took her last dose of calcium yesterday.       Allergies:  Review of patient's allergies indicates:   Allergen Reactions    Corticosteroids (glucocorticoids) Swelling    Cigarette smoke Hives     Coughing       History of Present Illness   HPI    6/5/2025, 5:33 PM  The history is provided by the past medical records and patient    Lisseth Rivas is a 24 y.o. female patient with a PMHx of thyroid cancer (treated with thyroidectomy on 5/15/2025), anxiety (treated with Prozac), and depression who presents to the Emergency Department for evaluation of generalized weakness/fatigue which began earlier today.  Patient had taken her last dose calcium supplements (Tums) yesterday.  Patient denies any nausea, vomiting, diarrhea, chest pain, chest pressure, shortness of breath, dysuria urgency, frequency.  Nothing made it better, nothing made it worse.    Arrival mode: Private Vehicle     PCP: Jeanette Apple MD     Past Medical History:  Past Medical History:   Diagnosis Date    Allergic rhinitis     Cancer     thyroid    Depression     Seasonal allergies     Thyroid disease        Past Surgical History:  Past Surgical History:   Procedure Laterality Date    THYROIDECTOMY Bilateral 5/15/2025    Procedure: THYROIDECTOMY;  Surgeon: Abbey Cornelius MD;  Location: [facility];  Service: ENT;  Laterality: Bilateral;    WISDOM TOOTH EXTRACTION           Family History:  Family History   Problem Relation Name Age of Onset    Asthma Mother Tigist Rivas     Migraines Mother Tigist Rivas     Anxiety disorder Father Santos Rivas     Allergies Father Santos Rivas      Asthma Brother Mickey Rivas     Allergies Brother Mickey Rivas     Diabetes Maternal Grandmother Dorcas Addison     Cancer Paternal Grandmother Sofia Catherine         Colon Cancer    Diabetes Paternal Grandfather Blair Rivas     Asthma Paternal Grandfather Blair Rivas     Heart failure Paternal Grandfather Blair Rivas     Thyroid disease Paternal Aunt Ning Major         Thyroid Mass       Social History:  Social History     Tobacco Use    Smoking status: Never    Smokeless tobacco: Never   Vaping Use    Vaping status: Never Used   Substance and Sexual Activity    Alcohol use: Yes     Comment: seldom    Drug use: Not Currently    Sexual activity: Yes     Partners: Male     Birth control/protection: Injection       I have reviewed the Past Medical History, Past Surgical History, Family History and Social History as documented above.      Review of Systems   Review of Systems   Constitutional:  Positive for fatigue. Negative for fever.   Respiratory:  Negative for shortness of breath.    Cardiovascular:  Negative for chest pain.   Gastrointestinal:  Negative for abdominal pain, nausea and vomiting.   Genitourinary:  Negative for dysuria.   Musculoskeletal:  Negative for back pain.   Skin:  Negative for rash.   Neurological:  Negative for weakness.   Hematological:  Does not bruise/bleed easily.        Physical Exam     Initial Vitals [06/05/25 1450]   BP Pulse Resp Temp SpO2   134/82 95 16 98.8 °F (37.1 °C) 98 %      MAP       --          Physical Exam    Nursing Notes and Vital Signs Reviewed.  Constitutional: Patient is in no apparent distress. Well-developed and well-nourished.  Head: Atraumatic. Normocephalic.  Eyes: PERRL. EOM intact. Conjunctivae are not pale. No scleral icterus.  ENT: Mucous membranes are moist. Oropharynx is clear and symmetric.    Neck: Supple. Full ROM. No lymphadenopathy.  Cardiovascular: Regular rate. Regular rhythm. No murmurs, rubs, or gallops. Distal pulses are 2+ and  "symmetric.  Pulmonary/Chest: No respiratory distress. Clear to auscultation bilaterally. No wheezing or rales.  Abdominal: Soft and non-distended.  There is no tenderness.  No rebound, guarding, or rigidity. Good bowel sounds.  Genitourinary: N/A   Musculoskeletal: Moves all extremities. No obvious deformities. No edema. No calf tenderness.  Skin: Warm and dry.  Neurological:  Alert, awake, and appropriate.  Normal speech.  No acute focal neurological deficits are appreciated.  Psychiatric: Normal affect. Good eye contact. Appropriate in content.     ED Course   ED Procedures:  Procedures    ED Vital Signs:  Vitals:    06/05/25 1450 06/05/25 1636 06/05/25 1641 06/05/25 1645   BP: 134/82 129/79  120/68   Pulse: 95 73  72   Resp: 16 16  18   Temp: 98.8 °F (37.1 °C)      TempSrc: Oral      SpO2: 98% 99%  100%   Weight: 71.2 kg (157 lb)  71.2 kg (156 lb 15.5 oz)    Height:   5' 4" (1.626 m)     06/05/25 1715 06/05/25 1730   BP: 124/68 123/73   Pulse: 74 76   Resp: 16 16   Temp:     TempSrc:     SpO2: 100% 100%   Weight:     Height:         All Lab Results:  Results for orders placed or performed during the hospital encounter of 06/05/25   Hepatitis C Antibody    Collection Time: 06/05/25  3:26 PM   Result Value Ref Range    Hep C Ab Interp Negative Negative   HCV Virus Hold Specimen    Collection Time: 06/05/25  3:26 PM   Result Value Ref Range    Extra Tube Hold for add-ons.    HIV 1/2 Ag/Ab (4th Gen)    Collection Time: 06/05/25  3:26 PM   Result Value Ref Range    HIV 1/2 Ag/Ab Negative Negative   Comprehensive metabolic panel    Collection Time: 06/05/25  3:26 PM   Result Value Ref Range    Sodium 138 136 - 145 mmol/L    Potassium 3.6 3.5 - 5.1 mmol/L    Chloride 106 95 - 110 mmol/L    CO2 24 23 - 29 mmol/L    Glucose 95 70 - 110 mg/dL    BUN 9 6 - 20 mg/dL    Creatinine 0.7 0.5 - 1.4 mg/dL    Calcium 9.8 8.7 - 10.5 mg/dL    Protein Total 8.5 (H) 6.0 - 8.4 gm/dL    Albumin 4.3 3.5 - 5.2 g/dL    Bilirubin Total 0.5 " 0.1 - 1.0 mg/dL    ALP 77 40 - 150 unit/L    AST 14 11 - 45 unit/L    ALT 12 10 - 44 unit/L    Anion Gap 8 8 - 16 mmol/L    eGFR >60 >60 mL/min/1.73/m2   CBC with Differential    Collection Time: 06/05/25  3:26 PM   Result Value Ref Range    WBC 11.99 3.90 - 12.70 K/uL    RBC 4.46 4.00 - 5.40 M/uL    HGB 13.0 12.0 - 16.0 gm/dL    HCT 38.4 37.0 - 48.5 %    MCV 86 82 - 98 fL    MCH 29.1 27.0 - 31.0 pg    MCHC 33.9 32.0 - 36.0 g/dL    RDW 12.0 11.5 - 14.5 %    Platelet Count 280 150 - 450 K/uL    MPV 11.7 9.2 - 12.9 fL    Nucleated RBC 0 <=0 /100 WBC    Neut % 66.9 38 - 73 %    Lymph % 24.1 18 - 48 %    Mono % 7.5 4 - 15 %    Eos % 0.8 <=8 %    Basophil % 0.3 <=1.9 %    Imm Grans % 0.4 0.0 - 0.5 %    Neut # 8.02 (H) 1.8 - 7.7 K/uL    Lymph # 2.89 1 - 4.8 K/uL    Mono # 0.90 0.3 - 1 K/uL    Eos # 0.09 <=0.5 K/uL    Baso # 0.04 <=0.2 K/uL    Imm Grans # 0.05 (H) 0.00 - 0.04 K/uL   Urinalysis, Reflex to Urine Culture Urine, Clean Catch    Collection Time: 06/05/25  3:31 PM    Specimen: Urine   Result Value Ref Range    Color, UA Yellow Straw, Cadence, Yellow, Light-Orange    Appearance, UA Clear Clear    pH, UA 8.0 5.0 - 8.0    Spec Grav UA 1.020 1.005 - 1.030    Protein, UA Negative Negative    Glucose, UA Negative Negative    Ketones, UA Negative Negative    Bilirubin, UA Negative Negative    Blood, UA Negative Negative    Nitrites, UA Negative Negative    Urobilinogen, UA Negative <2.0 EU/dL    Leukocyte Esterase, UA Negative Negative   Pregnancy, urine rapid (UPT)    Collection Time: 06/05/25  3:31 PM   Result Value Ref Range    hCG Qualitative, Urine Negative Negative   GREY TOP URINE HOLD    Collection Time: 06/05/25  3:31 PM   Result Value Ref Range    Extra Tube Hold for add-ons.        Reviewed Prior Labs:  N/A    No EKG ordered.    Imaging Results:  Imaging Results    None               The Emergency Provider reviewed the vital signs and test results, which are outlined above.     ED Discussion   ED  Medication(s):  Medications - No data to display         5:34 PM - Reassessment: Dr. Sousa reassessed the pt.  The pt is resting comfortably and is NAD. Discussed test results, shared treatment plan, specific conditions for return, and the need for f/u.  Answered their questions at this time.  Pt and/or family/caretaker understands and agrees to the plan.  The pt has remained hemodynamically stable through ED course and is stable for discharge.    I discussed with patient and/or family/caretaker that evaluation in the ED does not suggest any emergent or life threatening medical conditions requiring immediate intervention beyond what was provided in the ED, and I believe patient is safe for discharge.  Regardless, an unremarkable evaluation in the ED does not preclude the development or presence of a serious of life threatening condition. As such, patient was instructed to return immediately for any worsening or change in current symptoms.      MIPS Measures     Smoker? No     Hypertension: Patient did not have any elevated blood pressures in the Emergency Department.     Medical Decision Making                 Medical Decision Making  Differential diagnosis: Hypocalcemia, anxiety attack, hypoglycemia        Prescription Management: I performed a review of the patient's current Rx medication list as input by nursing staff.    Patient's Medications   New Prescriptions    No medications on file   Previous Medications    CALCIUM CARBONATE (TUMS) 200 MG CALCIUM (500 MG) CHEWABLE TABLET    Take 2 tablets (1,000 mg total) by mouth 3 (three) times daily for 7 days, THEN 2 tablets (1,000 mg total) 2 (two) times daily for 7 days, THEN 2 tablets (1,000 mg total) once daily for 7 days.    FLUOXETINE 40 MG CAPSULE    Take 1 capsule (40 mg total) by mouth once daily.    HYDROXYZINE PAMOATE (VISTARIL) 25 MG CAP    Take 1 capsule (25 mg total) by mouth every 8 (eight) hours as needed (anxiety).    LEVOTHYROXINE (SYNTHROID) 112  "MCG TABLET    Take 1 tablet (112 mcg total) by mouth before breakfast.   Modified Medications    No medications on file   Discontinued Medications    No medications on file        Discussed case verbally with:N/A    Referrals:  No orders of the defined types were placed in this encounter.    Portions of this note may have been created with voice recognition software. Occasional "wrong-word" or "sound-a-like" substitutions may have occurred due to the inherent limitations of voice recognition software. Please, read the note carefully and recognize, using context, where substitutions have occurred.          Clinical Impression       ICD-10-CM ICD-9-CM   1. Weakness  R53.1 780.79         ED Disposition     Disposition: Discharge to home  Patient condition: Good    ED Follow-up   Follow-up Information       Jeanette Apple MD In 2 days.    Specialty: Internal Medicine  Why: Return to emergency department for temperature, fever, nausea, vomiting, worsening weakness, or other concerns  Contact information:  [address] [address]  [address]  [facility], [place], operated by [facility] [address]  [phone]                               Scribe Attestation:   Scribe #1: I, Ada Arias, performed the above scribed service and the documentation accurately describes the services I performed. I attest to the accuracy of the note.     Attending:   Physician Attestation Statement for Scribe #1: IMali DO, FACEP, personally performed the services described in this documentation, as scribed by Ada Arias, in my presence, and it is both accurate and complete.                   Mali Sousa DO  06/06/25 0213    "

## 2025-06-16 ENCOUNTER — LAB VISIT (OUTPATIENT)
Dept: LAB | Facility: HOSPITAL | Age: 25
End: 2025-06-16
Attending: INTERNAL MEDICINE
Payer: COMMERCIAL

## 2025-06-16 DIAGNOSIS — C73 THYROID CANCER: ICD-10-CM

## 2025-06-16 DIAGNOSIS — E89.0 POSTOPERATIVE HYPOTHYROIDISM: ICD-10-CM

## 2025-06-16 LAB
ALBUMIN SERPL BCP-MCNC: 4.3 G/DL (ref 3.5–5.2)
ANION GAP (OHS): 10 MMOL/L (ref 8–16)
BUN SERPL-MCNC: 8 MG/DL (ref 6–20)
CALCIUM SERPL-MCNC: 9.5 MG/DL (ref 8.7–10.5)
CHLORIDE SERPL-SCNC: 104 MMOL/L (ref 95–110)
CO2 SERPL-SCNC: 24 MMOL/L (ref 23–29)
CREAT SERPL-MCNC: 0.7 MG/DL (ref 0.5–1.4)
GFR SERPLBLD CREATININE-BSD FMLA CKD-EPI: >60 ML/MIN/1.73/M2
GLUCOSE SERPL-MCNC: 79 MG/DL (ref 70–110)
PHOSPHATE SERPL-MCNC: 3.2 MG/DL (ref 2.7–4.5)
POTASSIUM SERPL-SCNC: 3.9 MMOL/L (ref 3.5–5.1)
PTH-INTACT SERPL-MCNC: 42.2 PG/ML (ref 9–77)
SODIUM SERPL-SCNC: 138 MMOL/L (ref 136–145)
T4 FREE SERPL-MCNC: 1.42 NG/DL (ref 0.71–1.51)
TSH SERPL-ACNC: 0.05 UIU/ML (ref 0.4–4)

## 2025-06-16 PROCEDURE — 80069 RENAL FUNCTION PANEL: CPT

## 2025-06-16 PROCEDURE — 84439 ASSAY OF FREE THYROXINE: CPT

## 2025-06-16 PROCEDURE — 83970 ASSAY OF PARATHORMONE: CPT

## 2025-06-16 PROCEDURE — 84443 ASSAY THYROID STIM HORMONE: CPT

## 2025-06-16 PROCEDURE — 36415 COLL VENOUS BLD VENIPUNCTURE: CPT

## 2025-06-17 NOTE — PROGRESS NOTES
Primary Care Telemedicine Note  The patient location is:  Patient Home   The chief complaint leading to consultation is: follow up of chronic medical conditions   Total time spent with patient: 15 min    Visit type: Virtual visit with synchronous audio and video  Each patient to whom he or she provides medical services by telemedicine is:  (1) informed of the relationship between the physician and patient and the respective role of any other health care provider with respect to management of the patient; and (2) notified that he or she may decline to receive medical services by telemedicine and may withdraw from such care at any time.    Assessment/Plan:    1. Papillary thyroid carcinoma  Overview:  -s/p total thyroidectomy  -not currently meeting criteria to need DE LA O  -following with endo/ENT      2. STEPHEN (generalized anxiety disorder)  Overview:  -chronic  -previously on lexapro for treatment in past but she was unsure of effectiveness of that medication  -started on prozac 20 mg QD  -dose increased to 40 mg at last visit with improvement of symptoms   -denies any AE of medication thus far  -plan to continue at current dose  -discussed risk of discontinuing this medication without tapering once on established dose  -patient was educated, advised of side effects, and all questions were answered.  Patient voiced understanding  -patient will follow up routinely and notify us if having any side effects or worsening or persistent symptoms.  ER precautions were given.    Orders:  -     FLUoxetine 40 MG capsule; Take 1 capsule (40 mg total) by mouth once daily.  Dispense: 90 capsule; Refill: 3    3. Major depressive disorder with single episode, in partial remission  -     FLUoxetine 40 MG capsule; Take 1 capsule (40 mg total) by mouth once daily.  Dispense: 90 capsule; Refill: 3    4. Postoperative hypothyroidism  Overview:  -managed by endocrinology  -started on levothyroxine 112 mcg QD  -follow up labs  scheduled          Visit today included increased complexity associated with the care of the episodic problem(s) addressed and managing the longitudinal care of the patient due to the serious and/or complex managed problem(s). See above assessment/plan.    Follow up if symptoms worsen or fail to improve.    Jeanette Apple MD  _____________________________________________________________________________________________________________________________________________________    HPI:    Patient is an established patient who presents today via virtual visit.    History of Present Illness  The patient presents via virtual visit for evaluation of hypothyroidism and depression.    She reports persistent fatigue, which she attributes to her thyroid condition. She expresses concern that her medication dosage may not be increased even if her TSH levels are within the normal range, despite her ongoing exhaustion. She describes her current state as more fatigued than prior to her surgery, often struggling to keep her eyes open. She also reports a constant feeling of coldness, a symptom that has been present for the past 3 years. She has not experienced any constipation since her surgery, but notes a decreased appetite. Over the past 1.5 years, she has gained 30 pounds, but reports no additional weight gain post-surgery. She was initiated on levothyroxine 112 mcg following her surgery on 05/15/2025.    She continues her regimen of Prozac 40 mg, which she reports has been beneficial in managing her depression. She does not report any current issues with anxiety.     PAST SURGICAL HISTORY:  Thyroid surgery on 05/15/2025.       No other new complaints today.      Past Medical History:  Past Medical History:   Diagnosis Date    Allergic rhinitis     Cancer     thyroid    Depression     Seasonal allergies     Thyroid disease        Medications Ordered Prior to Encounter[1]    Review of Systems   Constitutional:  Positive for  malaise/fatigue. Negative for chills and fever.   HENT:  Negative for hearing loss.    Eyes:  Negative for discharge.   Respiratory:  Negative for cough, shortness of breath and wheezing.    Cardiovascular:  Negative for chest pain, palpitations and leg swelling.   Gastrointestinal:  Negative for abdominal pain, blood in stool, constipation, diarrhea and vomiting.   Genitourinary:  Negative for dysuria, frequency and hematuria.   Musculoskeletal:  Negative for back pain, joint pain and neck pain.   Neurological:  Negative for weakness and headaches.   Endo/Heme/Allergies:  Negative for polydipsia.   Psychiatric/Behavioral:  Negative for depression. The patient is not nervous/anxious.          Physical Exam   There were no vitals filed for this visit.   .FLOWAMB[14   BMI Readings from Last 1 Encounters:   06/05/25 26.94 kg/m²       Physical Exam  Constitutional:       General: She is not in acute distress.     Appearance: She is well-developed.   HENT:      Head: Normocephalic and atraumatic.   Pulmonary:      Effort: Pulmonary effort is normal. No respiratory distress.   Abdominal:      General: There is no distension.   Musculoskeletal:         General: Normal range of motion.      Cervical back: Normal range of motion.   Neurological:      Mental Status: She is alert and oriented to person, place, and time.   Psychiatric:         Mood and Affect: Mood normal.         Behavior: Behavior normal.         Thought Content: Thought content normal.         Judgment: Judgment normal.           DISCLAIMER: This note was compiled by using a speech recognition dictation system and therefore please be aware that typographical / speech recognition errors can and do occur.  Please contact me if you see any errors specifically.  Consent was obtained for CLAUDIA recording system prior to the visit.  Answers submitted by the patient for this visit:  Review of Systems Questionnaire (Submitted on 5/28/2025)  activity change:  No  unexpected weight change: No  rhinorrhea: No  trouble swallowing: No  visual disturbance: No  chest tightness: No  polyuria: No  difficulty urinating: No  menstrual problem: No  joint swelling: No  arthralgias: No  confusion: No  dysphoric mood: No         [1]   Current Outpatient Medications on File Prior to Visit   Medication Sig Dispense Refill    calcium carbonate (TUMS) 200 mg calcium (500 mg) chewable tablet Take 2 tablets (1,000 mg total) by mouth 3 (three) times daily for 7 days, THEN 2 tablets (1,000 mg total) 2 (two) times daily for 7 days, THEN 2 tablets (1,000 mg total) once daily for 7 days. 84 tablet 0    hydrOXYzine pamoate (VISTARIL) 25 MG Cap Take 1 capsule (25 mg total) by mouth every 8 (eight) hours as needed (anxiety). 60 capsule 5    levothyroxine (SYNTHROID) 112 MCG tablet Take 1 tablet (112 mcg total) by mouth before breakfast. 30 tablet 11     No current facility-administered medications on file prior to visit.

## 2025-06-23 ENCOUNTER — PATIENT MESSAGE (OUTPATIENT)
Dept: ENDOCRINOLOGY | Facility: CLINIC | Age: 25
End: 2025-06-23
Payer: COMMERCIAL

## 2025-06-23 DIAGNOSIS — C73 THYROID CANCER: Primary | ICD-10-CM

## 2025-06-24 ENCOUNTER — LAB VISIT (OUTPATIENT)
Dept: LAB | Facility: HOSPITAL | Age: 25
End: 2025-06-24
Attending: INTERNAL MEDICINE
Payer: COMMERCIAL

## 2025-06-24 ENCOUNTER — TELEPHONE (OUTPATIENT)
Dept: OTOLARYNGOLOGY | Facility: CLINIC | Age: 25
End: 2025-06-24
Payer: COMMERCIAL

## 2025-06-24 DIAGNOSIS — C73 THYROID CANCER: ICD-10-CM

## 2025-06-24 LAB — TSH SERPL-ACNC: 0.03 UIU/ML (ref 0.4–4)

## 2025-06-24 PROCEDURE — 84439 ASSAY OF FREE THYROXINE: CPT

## 2025-06-24 PROCEDURE — 84443 ASSAY THYROID STIM HORMONE: CPT

## 2025-06-24 PROCEDURE — 36415 COLL VENOUS BLD VENIPUNCTURE: CPT | Mod: PN

## 2025-06-24 PROCEDURE — 86800 THYROGLOBULIN ANTIBODY: CPT

## 2025-06-24 RX ORDER — LEVOTHYROXINE SODIUM 100 UG/1
100 TABLET ORAL
Qty: 30 TABLET | Refills: 11 | Status: SHIPPED | OUTPATIENT
Start: 2025-06-24 | End: 2026-06-24

## 2025-06-24 NOTE — TELEPHONE ENCOUNTER
Copied from CRM #[id number]. Topic: General Inquiry - Patient Advice  >> Jun 24, 2025 10:06 AM Butch wrote:  Type:  Needs Medical Advice    Who Called: Patient    Would the patient rather a call back or a response via [facility]? Zach  Best Call Back Number: [phone]   Additional Information: States ins needs an operation report to give to insurance, FA 1500 form. Please call/ send through [facility]

## 2025-06-24 NOTE — TELEPHONE ENCOUNTER
I spoke with ms Mota in billing she will call pt about the forms she needs completed.  I let pt know if not resolved to call us back.

## 2025-06-24 NOTE — TELEPHONE ENCOUNTER
I dont see that the Tg was done. Can we have her do that  If still on tums ok to stop  Decrease synthroid to 100 mcg daily  Check TSh in 6 weeks

## 2025-06-25 LAB — T4 FREE SERPL-MCNC: 1.27 NG/DL (ref 0.71–1.51)

## 2025-06-26 ENCOUNTER — RESULTS FOLLOW-UP (OUTPATIENT)
Dept: ENDOCRINOLOGY | Facility: CLINIC | Age: 25
End: 2025-06-26

## 2025-06-26 LAB
ENDOCRINOLOGIST REVIEW: NORMAL
THYROGLOB AB SERPL IA-ACNC: <1.8 IU/ML
THYROGLOB SERPL-MCNC: <0.1 NG/ML

## 2025-07-07 ENCOUNTER — LAB VISIT (OUTPATIENT)
Dept: LAB | Facility: HOSPITAL | Age: 25
End: 2025-07-07
Attending: INTERNAL MEDICINE
Payer: COMMERCIAL

## 2025-07-07 ENCOUNTER — PATIENT MESSAGE (OUTPATIENT)
Dept: ENDOCRINOLOGY | Facility: CLINIC | Age: 25
End: 2025-07-07
Payer: COMMERCIAL

## 2025-07-07 DIAGNOSIS — C73 THYROID CANCER: ICD-10-CM

## 2025-07-07 DIAGNOSIS — E89.0 POSTOPERATIVE HYPOTHYROIDISM: ICD-10-CM

## 2025-07-07 DIAGNOSIS — C73 THYROID CANCER: Primary | ICD-10-CM

## 2025-07-07 LAB — TSH SERPL-ACNC: 0.05 UIU/ML (ref 0.4–4)

## 2025-07-07 PROCEDURE — 84439 ASSAY OF FREE THYROXINE: CPT

## 2025-07-07 PROCEDURE — 36415 COLL VENOUS BLD VENIPUNCTURE: CPT

## 2025-07-07 PROCEDURE — 84443 ASSAY THYROID STIM HORMONE: CPT

## 2025-07-07 NOTE — TELEPHONE ENCOUNTER
Assessment/Plan:    Obesity, Class III, BMI 40-49.9 (morbid obesity) (HCC)  - Discussed options of HealthyCORE-Intensive Lifestyle Intervention Program, Very Low Calorie Diet-VLCD, Conservative Program, Marshall-En-Y Gastric Bypass, and Vertical Sleeve Gastrectomy and the role of weight loss medications.  - Not interested in weight loss surgery.  - Explained the importance of making lifestyle changes with anti-obesity medications.  - Patient is interested in pursuing Conservative Program  - Initial weight loss goal of 5-10% weight loss for improved health  - Weight loss can improve patient's co-morbid conditions and/or prevent weight-related complications.  - Interested in weight loss medication to help with appetite and cravings.   - Postmenopausal.  - Medication contract signed 5/1/2024.   - FDA approved weight loss medications reviewed: Wegovy, Saxenda, Zepbound, Qsymia, Contrave, and Phentermine. Wegovy, Zepbound and Saxenda shortage discussed. Off label use of medications discussed.   - She is not interested in an injectable medication.   - Wellbutrin in the past - felt hot and sweaty, but unsure if those symptoms were related to Wellbutrin. Denies any significant side effects.  - Patient denies history of kidney stones, gallstones, seizures, or glaucoma.   - Will obtain some additional blood work first before starting weight loss medication.    - Discussed  visit for boredom eating and she was agreeable.   - Labs reviewed: Lipid, CMP, CBC, and TSH 4/29/2024. Chol, trig, LDL, and glucose elevated, which will likely improve with weight loss. Remainder of the blood work within acceptable range.  - Check A1C and fasting insulin.       Goals:  Do not skip meals.  Food log (ie.) www.[employer]ocracy.com,sparkpeople.com,loseit.com,AltheaDx.com,etc. baritastic (use skinnytaste.com, [employer] or smartphone susana MD On-Line for recipes)  No sugary beverages. At least 64oz of water daily.  Increase physical  Check TSH today and monthly x 3. F/U after 4th set  Should take LT4 by itself and prenatals should be taken 3-4 hours later   activity by 10 minutes daily. Gradually increase physical activity to a goal of 5 days per week for 30 minutes of MODERATE intensity PLUS 2 days per week of FULL BODY resistance training (use smartphone apps [employer], Home Workout, etc.)  Start food logging, weighing, and measuring food.   1500 calories per day. Sample menu given.   Increase water intake to at least 64 oz daily  Start exercise, such as walking 2 days per week for 10 minutes and gradually increase to goal of 5 days per week for 30 minutes.      Borderline hyperglycemia  Check A1C and fasting insulin.     Acid reflux disease  - Taking omeprazole. May improve with weight loss and lifestyle modification. Continue management with prescribing provider.       Essential hypertension  - Taking norvasc. May improve with weight loss and lifestyle modification. Continue management with prescribing provider.       Obstructive sleep apnea treated with BiPAP -  felt difficulty tolerating and stopped  - Risks of untreated sleep apnea reviewed, including increased risk for myocardial infarction and stroke, increased difficulty with weight loss, and risk of sudden cardiac death due to arrhythmia.  - Advised to follow-up with pulmonary.          Shandra was seen today for consult.    Diagnoses and all orders for this visit:    Obesity, Class III, BMI 40-49.9 (morbid obesity) (HCC)  -     Hemoglobin A1C; Future  -     Insulin, fasting; Future    Essential hypertension  -     Ambulatory Referral to Bariatric Surgery    Obstructive sleep apnea treated with BiPAP -  felt difficulty tolerating and stopped  -     Ambulatory Referral to Bariatric Surgery    Class 2 obesity due to excess calories without serious comorbidity with body mass index (BMI) of 38.0 to 38.9 in adult  -     Ambulatory Referral to Bariatric Surgery    Elevated fasting glucose  -     Hemoglobin A1C; Future  -     Insulin, fasting; Future    Borderline hyperglycemia    Gastroesophageal reflux disease without  esophagitis        Total time spent: 40 min, with >50% face-to-face time spent counseling patient on nonsurgical interventions for the treatment of excess weight. Discussed in detail nonsurgical options including intensive lifestyle intervention program, very low-calorie diet program and conservative program.  Discussed the role of weight loss medications.  Counseled patient on diet behavior and exercise modification for weight loss.          Follow up in approximately  2 month nurse visit and 4 months  with Non-Surgical Physician/Advanced Practitioner.    Subjective:   Chief Complaint   Patient presents with    Consult     Consult  Waist-48in  Goal weight-165lb  Has sleep apnea, can't use cpap.        Patient ID: Shandra Mcmullen  is a 61 y.o. female with excess weight/obesity here to pursue weight management.  Previous notes and records have been reviewed.    Past Medical History:   Diagnosis Date    Abnormal mammogram of right breast 02/09/2018    Acid reflux     Anxiety     Asthmatic bronchitis     Back pain     Carpal tunnel syndrome     COVID-19     COVID-19 virus infection 02/04/2021    mild    Depression     Depression     Hyperglycemia     Hypertension     Lipoma     Migraine     Miscarriage     Osteoarthritis     PONV (postoperative nausea and vomiting)     Stress incontinence     UTI (urinary tract infection)      Past Surgical History:   Procedure Laterality Date    APPENDECTOMY      BREAST EXCISIONAL BIOPSY Left 03/2001    benign    COLONOSCOPY N/A 4/8/2016    Procedure: COLONOSCOPY;  Surgeon: Suellen Abreu MD;  Location: [facility];  Service:     KNEE ARTHROSCOPY      AZ EXC B9 LESION MRGN XCP SK TG T/A/L 0.5 CM/< Left 5/19/2016    Procedure: EXCISION LIPOMA SHOULDER ;  Surgeon: Suellen Abreu MD;  Location: [facility] OR;  Service: General    TUBAL LIGATION      WISDOM TOOTH EXTRACTION         HPI:  Wt Readings from Last 20 Encounters:   05/01/24 113 kg (248 lb 6.4 oz)   12/28/23 111 kg (245 lb)    12/12/23 109 kg (240 lb)   05/19/23 109 kg (241 lb)   04/20/23 109 kg (241 lb)   12/21/22 110 kg (242 lb)   10/18/22 110 kg (242 lb)   04/18/22 107 kg (236 lb)   02/02/22 107 kg (235 lb 9.6 oz)   01/31/22 105 kg (232 lb)   01/18/22 108 kg (239 lb)   12/19/21 105 kg (232 lb)   12/14/21 107 kg (236 lb)   11/09/21 109 kg (240 lb)   10/04/21 108 kg (238 lb)   07/15/21 108 kg (238 lb)   02/23/21 107 kg (236 lb)   01/29/21 105 kg (232 lb)   12/14/20 101 kg (223 lb)   09/25/20 105 kg (232 lb)     Obesity/Excess Weight:  Severity: Severe  Onset:  almost 28 years    Modifiers: Diet and Exercise and Commercial Weight Loss Programs-ie. Weight Watchers, [employer], Nutrisystem, etc.  Contributing factors: Poor Food Choices and larger portions  Associated symptoms: increased joint pain and increased shortness of breath    Feels hungry a lot. Having cravings for sweet and salty foods. Boredom eating component.     Wellbutrin in the past - felt hot and sweaty, but not sure if it was definitely related to Wellbutrin.     Hydration: 32 oz water, 1 cup coffee with creamer, diet green tea mixed with water, occ soda  Alcohol: 1-2 drinks per month  Smoking: denies  Exercise: nothing formal   Occupation: cleaning and helping elderly   Sleep: 8 hours, but fragmented   STOP bang: Has PRAVEENA, tried CPAP, but could not tolerate it. Advised to follow-up with pulmonary.     Highest weight: current  Current weight: 248.4 lbs BMI 40.71  Goal weight: 165 lbs    Colonoscopy: UTD, due 2025  Mammogram: due, has order, encouraged to schedule     The following portions of the patient's history were reviewed and updated as appropriate: allergies, current medications, past family history, past medical history, past social history, past surgical history, and problem list.    Family History   Problem Relation Age of Onset    Stroke Mother     Stomach cancer Mother 73    Asthma Mother     Cancer Mother     Skin cancer Father     Alzheimer's disease Father   "   No Known Problems Sister     No Known Problems Sister     Stroke Brother     No Known Problems Daughter     No Known Problems Daughter     Heart disease Maternal Grandmother     No Known Problems Maternal Grandfather     Breast cancer Paternal Grandmother 50        Grammy    No Known Problems Paternal Grandfather     No Known Problems Maternal Aunt     No Known Problems Maternal Aunt     No Known Problems Maternal Aunt     No Known Problems Maternal Aunt     Lung cancer Paternal Uncle     Breast cancer Cousin 45    Breast cancer Cousin 45    Breast cancer Cousin 50    Breast cancer Cousin 50        Review of Systems   HENT:  Negative for sore throat.    Respiratory:  Negative for cough and shortness of breath.    Cardiovascular:  Negative for chest pain and palpitations.   Gastrointestinal:  Negative for constipation, diarrhea, nausea and vomiting.        + GERD controlled medication   Endocrine: Positive for heat intolerance. Negative for cold intolerance.   Genitourinary:  Negative for dysuria.   Musculoskeletal:  Positive for arthralgias (chronic) and back pain (chronic).   Skin:  Negative for rash.   Neurological:  Negative for headaches.   Psychiatric/Behavioral:  Negative for suicidal ideas (or HI).         + depression and anxiety situational       Objective:  /74   Pulse 85   Temp 98.2 °F (36.8 °C)   Resp 18   Ht 5' 5.5\" (1.664 m)   Wt 113 kg (248 lb 6.4 oz)   LMP  (LMP Unknown)   SpO2 100%   BMI 40.71 kg/m²     Physical Exam  Vitals and nursing note reviewed.          Constitutional   General appearance: Abnormal.  well developed and morbidly obese.   Eyes No conjunctival injection.   Ears, Nose, Mouth, and Throat Oral mucosa moist.   Pulmonary   Respiratory effort: No increased work of breathing or signs of respiratory distress.    Cardiovascular    Examination of extremities for edema and/or varicosities: Normal.  no edema.   Abdomen   Abdomen: Abnormal.  The abdomen was obese.  "   Musculoskeletal   Normal range of motion  Neurological   Gait and station: Normal.   Psychiatric   Orientation to person, place and time: Normal.    Affect: appropriate

## 2025-07-08 LAB — T4 FREE SERPL-MCNC: 1.27 NG/DL (ref 0.71–1.51)

## 2025-07-10 ENCOUNTER — PATIENT MESSAGE (OUTPATIENT)
Dept: ENDOCRINOLOGY | Facility: CLINIC | Age: 25
End: 2025-07-10
Payer: COMMERCIAL

## 2025-07-10 DIAGNOSIS — E89.0 POSTOPERATIVE HYPOTHYROIDISM: Primary | ICD-10-CM

## 2025-07-11 RX ORDER — LEVOTHYROXINE SODIUM 88 UG/1
88 TABLET ORAL
Qty: 30 TABLET | Refills: 11 | Status: SHIPPED | OUTPATIENT
Start: 2025-07-11 | End: 2026-07-11

## 2025-07-11 NOTE — TELEPHONE ENCOUNTER
Lets try reducing the dose some to get it closer to normal  Best to wait until levels better if actively trying to get pregnant.   Decrease synthroid to 88 mcg daily    Check TSH 6 weeks  We would still need to monitor TG. She has one scheduled at f/u

## 2025-08-22 ENCOUNTER — LAB VISIT (OUTPATIENT)
Dept: LAB | Facility: HOSPITAL | Age: 25
End: 2025-08-22
Attending: INTERNAL MEDICINE
Payer: COMMERCIAL

## 2025-08-22 DIAGNOSIS — C73 THYROID CANCER: ICD-10-CM

## 2025-08-22 DIAGNOSIS — E89.0 POSTOPERATIVE HYPOTHYROIDISM: ICD-10-CM

## 2025-08-22 LAB
ALBUMIN SERPL BCP-MCNC: 4.5 G/DL (ref 3.5–5.2)
ANION GAP (OHS): 10 MMOL/L (ref 8–16)
BUN SERPL-MCNC: 9 MG/DL (ref 6–20)
CALCIUM SERPL-MCNC: 10 MG/DL (ref 8.7–10.5)
CHLORIDE SERPL-SCNC: 105 MMOL/L (ref 95–110)
CO2 SERPL-SCNC: 23 MMOL/L (ref 23–29)
CREAT SERPL-MCNC: 0.7 MG/DL (ref 0.5–1.4)
GFR SERPLBLD CREATININE-BSD FMLA CKD-EPI: >60 ML/MIN/1.73/M2
GLUCOSE SERPL-MCNC: 78 MG/DL (ref 70–110)
PHOSPHATE SERPL-MCNC: 3.3 MG/DL (ref 2.7–4.5)
POTASSIUM SERPL-SCNC: 4.1 MMOL/L (ref 3.5–5.1)
SODIUM SERPL-SCNC: 138 MMOL/L (ref 136–145)
TSH SERPL-ACNC: 0.83 UIU/ML (ref 0.4–4)

## 2025-08-22 PROCEDURE — 80069 RENAL FUNCTION PANEL: CPT

## 2025-08-22 PROCEDURE — 36415 COLL VENOUS BLD VENIPUNCTURE: CPT

## 2025-08-22 PROCEDURE — 84443 ASSAY THYROID STIM HORMONE: CPT

## 2025-08-22 PROCEDURE — 84432 ASSAY OF THYROGLOBULIN: CPT

## 2025-08-23 ENCOUNTER — RESULTS FOLLOW-UP (OUTPATIENT)
Dept: ENDOCRINOLOGY | Facility: CLINIC | Age: 25
End: 2025-08-23
Payer: COMMERCIAL

## 2025-08-23 ENCOUNTER — PATIENT MESSAGE (OUTPATIENT)
Dept: ENDOCRINOLOGY | Facility: CLINIC | Age: 25
End: 2025-08-23
Payer: COMMERCIAL

## 2025-08-25 LAB
ENDOCRINOLOGIST REVIEW: ABNORMAL
THYROGLOB AB SERPL IA-ACNC: 2.1 IU/ML
THYROGLOB SERPL-MCNC: <0.1 NG/ML